# Patient Record
Sex: MALE | Race: WHITE | Employment: UNEMPLOYED | URBAN - METROPOLITAN AREA
[De-identification: names, ages, dates, MRNs, and addresses within clinical notes are randomized per-mention and may not be internally consistent; named-entity substitution may affect disease eponyms.]

---

## 2017-10-18 ENCOUNTER — HOSPITAL ENCOUNTER (OUTPATIENT)
Dept: LAB | Age: 57
Discharge: HOME | End: 2017-10-18
Payer: COMMERCIAL

## 2017-10-18 PROCEDURE — 88305 TISSUE EXAM BY PATHOLOGIST: CPT | Performed by: PLASTIC SURGERY

## 2017-10-18 PROCEDURE — 88331 PATH CONSLTJ SURG 1 BLK 1SPC: CPT | Performed by: PLASTIC SURGERY

## 2018-01-02 PROBLEM — R97.20 ELEVATED PSA, BETWEEN 10 AND LESS THAN 20 NG/ML: Status: ACTIVE | Noted: 2017-12-08

## 2018-01-02 PROBLEM — R52 PAIN MANAGEMENT: Status: ACTIVE | Noted: 2017-12-06

## 2018-01-02 PROBLEM — M54.12 CERVICAL RADICULOPATHY: Status: ACTIVE | Noted: 2017-08-14

## 2018-01-02 PROBLEM — D04.9 SQUAMOUS CELL CARCINOMA IN SITU OF SKIN: Status: ACTIVE | Noted: 2017-12-06

## 2018-01-02 PROBLEM — Z98.890 S/P LEFT ROTATOR CUFF REPAIR: Status: ACTIVE | Noted: 2017-12-06

## 2018-01-02 PROBLEM — M47.812 CERVICAL SPONDYLOSIS WITHOUT MYELOPATHY: Status: ACTIVE | Noted: 2017-06-13

## 2018-01-17 PROBLEM — C61 PROSTATE CANCER (HCC): Status: ACTIVE | Noted: 2018-01-17

## 2018-02-05 PROBLEM — R94.31 NONSPECIFIC ABNORMAL ELECTROCARDIOGRAM (ECG) (EKG): Status: ACTIVE | Noted: 2018-01-05

## 2018-02-05 PROBLEM — I31.39 PERICARDIAL EFFUSION: Status: ACTIVE | Noted: 2018-01-05

## 2018-02-05 PROBLEM — R07.9 CHEST PAIN: Status: ACTIVE | Noted: 2018-01-05

## 2018-03-05 RX ORDER — GUAIFENESIN 100 MG/5ML
81 LIQUID (ML) ORAL DAILY
COMMUNITY
End: 2018-11-27

## 2018-03-08 ENCOUNTER — ANESTHESIA EVENT (OUTPATIENT)
Dept: SURGERY | Age: 58
DRG: 708 | End: 2018-03-08
Payer: COMMERCIAL

## 2018-03-09 ENCOUNTER — HOSPITAL ENCOUNTER (INPATIENT)
Age: 58
LOS: 1 days | Discharge: HOME | DRG: 708 | End: 2018-03-10
Attending: UROLOGY | Admitting: UROLOGY
Payer: COMMERCIAL

## 2018-03-09 ENCOUNTER — ANESTHESIA (OUTPATIENT)
Dept: SURGERY | Age: 58
DRG: 708 | End: 2018-03-09
Payer: COMMERCIAL

## 2018-03-09 DIAGNOSIS — C61 MALIGNANT NEOPLASM OF PROSTATE (HCC): Primary | ICD-10-CM

## 2018-03-09 PROCEDURE — 77030008683 HC TU ET CUF COVD -A: Performed by: ANESTHESIOLOGY

## 2018-03-09 PROCEDURE — 77030022704 HC SUT VLOC COVD -B: Performed by: UROLOGY

## 2018-03-09 PROCEDURE — 76210000017 HC OR PH I REC 1.5 TO 2 HR: Performed by: UROLOGY

## 2018-03-09 PROCEDURE — 77030031139 HC SUT VCRL2 J&J -A: Performed by: UROLOGY

## 2018-03-09 PROCEDURE — 77030014647 HC SEAL FBRN TISSL BAXT -D: Performed by: UROLOGY

## 2018-03-09 PROCEDURE — 74011000250 HC RX REV CODE- 250: Performed by: UROLOGY

## 2018-03-09 PROCEDURE — 76010000880 HC OR TIME 4 TO 4.5HR INTENSV - TIER 2: Performed by: UROLOGY

## 2018-03-09 PROCEDURE — 77030011640 HC PAD GRND REM COVD -A: Performed by: UROLOGY

## 2018-03-09 PROCEDURE — 77030013449 HC CLP LIG TELE -A: Performed by: UROLOGY

## 2018-03-09 PROCEDURE — 74011250636 HC RX REV CODE- 250/636

## 2018-03-09 PROCEDURE — 77030018842 HC SOL IRR SOD CL 9% BAXT -A: Performed by: UROLOGY

## 2018-03-09 PROCEDURE — 77030016151 HC PROTCTR LNS DFOG COVD -B: Performed by: UROLOGY

## 2018-03-09 PROCEDURE — 74011250636 HC RX REV CODE- 250/636: Performed by: ANESTHESIOLOGY

## 2018-03-09 PROCEDURE — 77030010935 HC CLP LIG ABSRB TELE -B: Performed by: UROLOGY

## 2018-03-09 PROCEDURE — 77030034696 HC CATH URETH FOL 2W BARD -A: Performed by: UROLOGY

## 2018-03-09 PROCEDURE — 77030008771 HC TU NG SALEM SUMP -A: Performed by: ANESTHESIOLOGY

## 2018-03-09 PROCEDURE — 74011250637 HC RX REV CODE- 250/637: Performed by: UROLOGY

## 2018-03-09 PROCEDURE — 77030010507 HC ADH SKN DERMBND J&J -B: Performed by: UROLOGY

## 2018-03-09 PROCEDURE — 88309 TISSUE EXAM BY PATHOLOGIST: CPT | Performed by: UROLOGY

## 2018-03-09 PROCEDURE — 77030008462 HC STPLR SKN PROX J&J -A: Performed by: UROLOGY

## 2018-03-09 PROCEDURE — 88305 TISSUE EXAM BY PATHOLOGIST: CPT | Performed by: UROLOGY

## 2018-03-09 PROCEDURE — 77030002966 HC SUT PDS J&J -A: Performed by: UROLOGY

## 2018-03-09 PROCEDURE — 74011000272 HC RX REV CODE- 272: Performed by: UROLOGY

## 2018-03-09 PROCEDURE — 74011250636 HC RX REV CODE- 250/636: Performed by: NURSE ANESTHETIST, CERTIFIED REGISTERED

## 2018-03-09 PROCEDURE — 74011250636 HC RX REV CODE- 250/636: Performed by: UROLOGY

## 2018-03-09 PROCEDURE — 8E0W0CZ ROBOTIC ASSISTED PROCEDURE OF TRUNK REGION, OPEN APPROACH: ICD-10-PCS | Performed by: UROLOGY

## 2018-03-09 PROCEDURE — 77030009848 HC PASSR SUT SET COOP -C: Performed by: UROLOGY

## 2018-03-09 PROCEDURE — 77030010513 HC APPL CLP LIG J&J -C: Performed by: UROLOGY

## 2018-03-09 PROCEDURE — 77030003028 HC SUT VCRL J&J -A: Performed by: UROLOGY

## 2018-03-09 PROCEDURE — 77030035048 HC TRCR ENDOSC OPTCL COVD -B: Performed by: UROLOGY

## 2018-03-09 PROCEDURE — 77030032490 HC SLV COMPR SCD KNE COVD -B: Performed by: UROLOGY

## 2018-03-09 PROCEDURE — 74011000250 HC RX REV CODE- 250

## 2018-03-09 PROCEDURE — 77030018846 HC SOL IRR STRL H20 ICUM -A: Performed by: UROLOGY

## 2018-03-09 PROCEDURE — 77030002986 HC SUT PROL J&J -A: Performed by: UROLOGY

## 2018-03-09 PROCEDURE — C1729 CATH, DRAINAGE: HCPCS | Performed by: UROLOGY

## 2018-03-09 PROCEDURE — 76060000039 HC ANESTHESIA 4 TO 4.5 HR: Performed by: UROLOGY

## 2018-03-09 PROCEDURE — 0VT00ZZ RESECTION OF PROSTATE, OPEN APPROACH: ICD-10-PCS | Performed by: UROLOGY

## 2018-03-09 PROCEDURE — 07BC0ZX EXCISION OF PELVIS LYMPHATIC, OPEN APPROACH, DIAGNOSTIC: ICD-10-PCS | Performed by: UROLOGY

## 2018-03-09 PROCEDURE — 77030010545: Performed by: UROLOGY

## 2018-03-09 PROCEDURE — 77030009852 HC PCH RTVR ENDOSC COVD -B: Performed by: UROLOGY

## 2018-03-09 PROCEDURE — 88307 TISSUE EXAM BY PATHOLOGIST: CPT | Performed by: UROLOGY

## 2018-03-09 PROCEDURE — 77030035277 HC OBTRTR BLDELSS DISP INTU -B: Performed by: UROLOGY

## 2018-03-09 PROCEDURE — 77030002933 HC SUT MCRYL J&J -A: Performed by: UROLOGY

## 2018-03-09 PROCEDURE — 77030013079 HC BLNKT BAIR HGGR 3M -A: Performed by: ANESTHESIOLOGY

## 2018-03-09 PROCEDURE — 77030010939 HC CLP LIG TELE -B: Performed by: UROLOGY

## 2018-03-09 PROCEDURE — 77030035045 HC TRCR ENDOSC VRSPRT BLDLSS COVD -B: Performed by: UROLOGY

## 2018-03-09 PROCEDURE — 77030010517 HC APPL SEAL FLOSEL BAXT -B: Performed by: UROLOGY

## 2018-03-09 PROCEDURE — 77030027138 HC INCENT SPIROMETER -A

## 2018-03-09 PROCEDURE — 65270000029 HC RM PRIVATE

## 2018-03-09 PROCEDURE — 77030018813 HC SCIS LAPSCP EPIX DISP AMR -B: Performed by: UROLOGY

## 2018-03-09 PROCEDURE — 77030020255 HC SOL INJ LR 1000ML BG

## 2018-03-09 PROCEDURE — 74011250637 HC RX REV CODE- 250/637

## 2018-03-09 RX ORDER — NALOXONE HYDROCHLORIDE 0.4 MG/ML
0.4 INJECTION, SOLUTION INTRAMUSCULAR; INTRAVENOUS; SUBCUTANEOUS AS NEEDED
Status: DISCONTINUED | OUTPATIENT
Start: 2018-03-09 | End: 2018-03-10 | Stop reason: HOSPADM

## 2018-03-09 RX ORDER — OXYBUTYNIN CHLORIDE 5 MG/1
5 TABLET ORAL
Status: DISCONTINUED | OUTPATIENT
Start: 2018-03-09 | End: 2018-03-10 | Stop reason: HOSPADM

## 2018-03-09 RX ORDER — VECURONIUM BROMIDE FOR INJECTION 1 MG/ML
INJECTION, POWDER, LYOPHILIZED, FOR SOLUTION INTRAVENOUS AS NEEDED
Status: DISCONTINUED | OUTPATIENT
Start: 2018-03-09 | End: 2018-03-09 | Stop reason: HOSPADM

## 2018-03-09 RX ORDER — MAGNESIUM SULFATE 100 %
4 CRYSTALS MISCELLANEOUS AS NEEDED
Status: DISCONTINUED | OUTPATIENT
Start: 2018-03-09 | End: 2018-03-09

## 2018-03-09 RX ORDER — NEOSTIGMINE METHYLSULFATE 5 MG/5 ML
SYRINGE (ML) INTRAVENOUS AS NEEDED
Status: DISCONTINUED | OUTPATIENT
Start: 2018-03-09 | End: 2018-03-09 | Stop reason: HOSPADM

## 2018-03-09 RX ORDER — CEFAZOLIN SODIUM 2 G/50ML
2 SOLUTION INTRAVENOUS
Status: COMPLETED | OUTPATIENT
Start: 2018-03-09 | End: 2018-03-09

## 2018-03-09 RX ORDER — HYDROMORPHONE HYDROCHLORIDE 2 MG/ML
1 INJECTION, SOLUTION INTRAMUSCULAR; INTRAVENOUS; SUBCUTANEOUS
Status: DISCONTINUED | OUTPATIENT
Start: 2018-03-09 | End: 2018-03-10 | Stop reason: HOSPADM

## 2018-03-09 RX ORDER — GLYCOPYRROLATE 0.2 MG/ML
INJECTION INTRAMUSCULAR; INTRAVENOUS AS NEEDED
Status: DISCONTINUED | OUTPATIENT
Start: 2018-03-09 | End: 2018-03-09 | Stop reason: HOSPADM

## 2018-03-09 RX ORDER — SODIUM CHLORIDE, SODIUM LACTATE, POTASSIUM CHLORIDE, CALCIUM CHLORIDE 600; 310; 30; 20 MG/100ML; MG/100ML; MG/100ML; MG/100ML
50 INJECTION, SOLUTION INTRAVENOUS CONTINUOUS
Status: DISCONTINUED | OUTPATIENT
Start: 2018-03-10 | End: 2018-03-09 | Stop reason: HOSPADM

## 2018-03-09 RX ORDER — LIDOCAINE HYDROCHLORIDE 20 MG/ML
INJECTION, SOLUTION EPIDURAL; INFILTRATION; INTRACAUDAL; PERINEURAL AS NEEDED
Status: DISCONTINUED | OUTPATIENT
Start: 2018-03-09 | End: 2018-03-09 | Stop reason: HOSPADM

## 2018-03-09 RX ORDER — DIPHENHYDRAMINE HYDROCHLORIDE 50 MG/ML
12.5 INJECTION, SOLUTION INTRAMUSCULAR; INTRAVENOUS
Status: DISCONTINUED | OUTPATIENT
Start: 2018-03-09 | End: 2018-03-10 | Stop reason: HOSPADM

## 2018-03-09 RX ORDER — SODIUM CHLORIDE, SODIUM LACTATE, POTASSIUM CHLORIDE, CALCIUM CHLORIDE 600; 310; 30; 20 MG/100ML; MG/100ML; MG/100ML; MG/100ML
25 INJECTION, SOLUTION INTRAVENOUS CONTINUOUS
Status: DISCONTINUED | OUTPATIENT
Start: 2018-03-09 | End: 2018-03-09

## 2018-03-09 RX ORDER — SODIUM CHLORIDE 0.9 % (FLUSH) 0.9 %
5-10 SYRINGE (ML) INJECTION AS NEEDED
Status: DISCONTINUED | OUTPATIENT
Start: 2018-03-09 | End: 2018-03-09 | Stop reason: HOSPADM

## 2018-03-09 RX ORDER — ONDANSETRON 2 MG/ML
4 INJECTION INTRAMUSCULAR; INTRAVENOUS
Status: DISCONTINUED | OUTPATIENT
Start: 2018-03-09 | End: 2018-03-10 | Stop reason: HOSPADM

## 2018-03-09 RX ORDER — FENTANYL CITRATE 50 UG/ML
INJECTION, SOLUTION INTRAMUSCULAR; INTRAVENOUS AS NEEDED
Status: DISCONTINUED | OUTPATIENT
Start: 2018-03-09 | End: 2018-03-09 | Stop reason: HOSPADM

## 2018-03-09 RX ORDER — ONDANSETRON 2 MG/ML
INJECTION INTRAMUSCULAR; INTRAVENOUS
Status: DISPENSED
Start: 2018-03-09 | End: 2018-03-10

## 2018-03-09 RX ORDER — DOCUSATE SODIUM 100 MG/1
100 CAPSULE, LIQUID FILLED ORAL DAILY
Status: DISCONTINUED | OUTPATIENT
Start: 2018-03-10 | End: 2018-03-10 | Stop reason: HOSPADM

## 2018-03-09 RX ORDER — SODIUM CHLORIDE 0.9 % (FLUSH) 0.9 %
5-10 SYRINGE (ML) INJECTION AS NEEDED
Status: DISCONTINUED | OUTPATIENT
Start: 2018-03-09 | End: 2018-03-10 | Stop reason: HOSPADM

## 2018-03-09 RX ORDER — OXYBUTYNIN CHLORIDE 5 MG/1
5 TABLET ORAL
Qty: 20 TAB | Refills: 0 | Status: SHIPPED | OUTPATIENT
Start: 2018-03-09 | End: 2018-04-18

## 2018-03-09 RX ORDER — SODIUM CHLORIDE, SODIUM LACTATE, POTASSIUM CHLORIDE, CALCIUM CHLORIDE 600; 310; 30; 20 MG/100ML; MG/100ML; MG/100ML; MG/100ML
100 INJECTION, SOLUTION INTRAVENOUS CONTINUOUS
Status: DISCONTINUED | OUTPATIENT
Start: 2018-03-09 | End: 2018-03-10 | Stop reason: HOSPADM

## 2018-03-09 RX ORDER — CEFAZOLIN SODIUM 2 G/50ML
2 SOLUTION INTRAVENOUS EVERY 8 HOURS
Status: COMPLETED | OUTPATIENT
Start: 2018-03-09 | End: 2018-03-10

## 2018-03-09 RX ORDER — FENTANYL CITRATE 50 UG/ML
50 INJECTION, SOLUTION INTRAMUSCULAR; INTRAVENOUS
Status: DISCONTINUED | OUTPATIENT
Start: 2018-03-09 | End: 2018-03-09

## 2018-03-09 RX ORDER — MIDAZOLAM HYDROCHLORIDE 1 MG/ML
INJECTION, SOLUTION INTRAMUSCULAR; INTRAVENOUS AS NEEDED
Status: DISCONTINUED | OUTPATIENT
Start: 2018-03-09 | End: 2018-03-09 | Stop reason: HOSPADM

## 2018-03-09 RX ORDER — PROPOFOL 10 MG/ML
INJECTION, EMULSION INTRAVENOUS AS NEEDED
Status: DISCONTINUED | OUTPATIENT
Start: 2018-03-09 | End: 2018-03-09 | Stop reason: HOSPADM

## 2018-03-09 RX ORDER — FENTANYL CITRATE 50 UG/ML
25 INJECTION, SOLUTION INTRAMUSCULAR; INTRAVENOUS AS NEEDED
Status: COMPLETED | OUTPATIENT
Start: 2018-03-09 | End: 2018-03-09

## 2018-03-09 RX ORDER — FAMOTIDINE 20 MG/1
20 TABLET, FILM COATED ORAL ONCE
Status: COMPLETED | OUTPATIENT
Start: 2018-03-10 | End: 2018-03-09

## 2018-03-09 RX ORDER — SODIUM CHLORIDE 0.9 % (FLUSH) 0.9 %
5-10 SYRINGE (ML) INJECTION EVERY 8 HOURS
Status: DISCONTINUED | OUTPATIENT
Start: 2018-03-09 | End: 2018-03-10

## 2018-03-09 RX ORDER — SODIUM CHLORIDE 0.9 % (FLUSH) 0.9 %
5-10 SYRINGE (ML) INJECTION EVERY 8 HOURS
Status: DISCONTINUED | OUTPATIENT
Start: 2018-03-09 | End: 2018-03-09 | Stop reason: HOSPADM

## 2018-03-09 RX ORDER — DEXTROSE 50 % IN WATER (D50W) INTRAVENOUS SYRINGE
25-50 AS NEEDED
Status: DISCONTINUED | OUTPATIENT
Start: 2018-03-09 | End: 2018-03-09

## 2018-03-09 RX ORDER — HYDROMORPHONE HYDROCHLORIDE 2 MG/1
2 TABLET ORAL
Status: DISCONTINUED | OUTPATIENT
Start: 2018-03-09 | End: 2018-03-10 | Stop reason: HOSPADM

## 2018-03-09 RX ORDER — BUPIVACAINE HYDROCHLORIDE 5 MG/ML
INJECTION, SOLUTION EPIDURAL; INTRACAUDAL AS NEEDED
Status: DISCONTINUED | OUTPATIENT
Start: 2018-03-09 | End: 2018-03-09 | Stop reason: HOSPADM

## 2018-03-09 RX ORDER — SUCCINYLCHOLINE CHLORIDE 20 MG/ML
INJECTION INTRAMUSCULAR; INTRAVENOUS AS NEEDED
Status: DISCONTINUED | OUTPATIENT
Start: 2018-03-09 | End: 2018-03-09 | Stop reason: HOSPADM

## 2018-03-09 RX ORDER — FAMOTIDINE 20 MG/1
TABLET, FILM COATED ORAL
Status: COMPLETED
Start: 2018-03-09 | End: 2018-03-09

## 2018-03-09 RX ORDER — DOCUSATE SODIUM 100 MG/1
100 CAPSULE, LIQUID FILLED ORAL 2 TIMES DAILY
Qty: 30 CAP | Refills: 0 | Status: SHIPPED | OUTPATIENT
Start: 2018-03-09 | End: 2018-03-24

## 2018-03-09 RX ORDER — ENOXAPARIN SODIUM 100 MG/ML
40 INJECTION SUBCUTANEOUS DAILY
Qty: 28 SYRINGE | Refills: 0 | Status: SHIPPED | OUTPATIENT
Start: 2018-03-09 | End: 2018-04-18

## 2018-03-09 RX ORDER — HEPARIN SODIUM 5000 [USP'U]/ML
5000 INJECTION, SOLUTION INTRAVENOUS; SUBCUTANEOUS EVERY 8 HOURS
Status: DISCONTINUED | OUTPATIENT
Start: 2018-03-09 | End: 2018-03-10 | Stop reason: HOSPADM

## 2018-03-09 RX ORDER — ONDANSETRON 2 MG/ML
4 INJECTION INTRAMUSCULAR; INTRAVENOUS
Status: COMPLETED | OUTPATIENT
Start: 2018-03-09 | End: 2018-03-09

## 2018-03-09 RX ORDER — TRAMADOL HYDROCHLORIDE 50 MG/1
50 TABLET ORAL
Qty: 30 TAB | Refills: 0 | Status: SHIPPED | OUTPATIENT
Start: 2018-03-09 | End: 2018-04-18

## 2018-03-09 RX ORDER — HEPARIN SODIUM 5000 [USP'U]/ML
5000 INJECTION, SOLUTION INTRAVENOUS; SUBCUTANEOUS
Status: COMPLETED | OUTPATIENT
Start: 2018-03-09 | End: 2018-03-09

## 2018-03-09 RX ADMIN — GLYCOPYRROLATE 0.4 MG: 0.2 INJECTION INTRAMUSCULAR; INTRAVENOUS at 11:35

## 2018-03-09 RX ADMIN — SODIUM CHLORIDE, SODIUM LACTATE, POTASSIUM CHLORIDE, AND CALCIUM CHLORIDE: 600; 310; 30; 20 INJECTION, SOLUTION INTRAVENOUS at 11:42

## 2018-03-09 RX ADMIN — FENTANYL CITRATE 50 MCG: 50 INJECTION, SOLUTION INTRAMUSCULAR; INTRAVENOUS at 11:35

## 2018-03-09 RX ADMIN — VECURONIUM BROMIDE FOR INJECTION 1 MG: 1 INJECTION, POWDER, LYOPHILIZED, FOR SOLUTION INTRAVENOUS at 10:34

## 2018-03-09 RX ADMIN — VECURONIUM BROMIDE FOR INJECTION 2 MG: 1 INJECTION, POWDER, LYOPHILIZED, FOR SOLUTION INTRAVENOUS at 09:50

## 2018-03-09 RX ADMIN — HEPARIN SODIUM 5000 UNITS: 5000 INJECTION, SOLUTION INTRAVENOUS; SUBCUTANEOUS at 22:31

## 2018-03-09 RX ADMIN — OXYBUTYNIN CHLORIDE 5 MG: 5 TABLET ORAL at 13:01

## 2018-03-09 RX ADMIN — FENTANYL CITRATE 25 MCG: 50 INJECTION, SOLUTION INTRAMUSCULAR; INTRAVENOUS at 12:29

## 2018-03-09 RX ADMIN — HYDROMORPHONE HYDROCHLORIDE 1 MG: 2 INJECTION INTRAMUSCULAR; INTRAVENOUS; SUBCUTANEOUS at 13:32

## 2018-03-09 RX ADMIN — LIDOCAINE HYDROCHLORIDE 40 MG: 20 INJECTION, SOLUTION EPIDURAL; INFILTRATION; INTRACAUDAL; PERINEURAL at 07:40

## 2018-03-09 RX ADMIN — OXYBUTYNIN CHLORIDE 5 MG: 5 TABLET ORAL at 20:10

## 2018-03-09 RX ADMIN — SODIUM CHLORIDE, SODIUM LACTATE, POTASSIUM CHLORIDE, AND CALCIUM CHLORIDE 100 ML/HR: 600; 310; 30; 20 INJECTION, SOLUTION INTRAVENOUS at 20:11

## 2018-03-09 RX ADMIN — FENTANYL CITRATE 50 MCG: 50 INJECTION, SOLUTION INTRAMUSCULAR; INTRAVENOUS at 08:19

## 2018-03-09 RX ADMIN — SODIUM CHLORIDE, SODIUM LACTATE, POTASSIUM CHLORIDE, AND CALCIUM CHLORIDE 100 ML/HR: 600; 310; 30; 20 INJECTION, SOLUTION INTRAVENOUS at 13:34

## 2018-03-09 RX ADMIN — ONDANSETRON 4 MG: 2 INJECTION INTRAMUSCULAR; INTRAVENOUS at 12:15

## 2018-03-09 RX ADMIN — HEPARIN SODIUM 5000 UNITS: 5000 INJECTION, SOLUTION INTRAVENOUS; SUBCUTANEOUS at 07:28

## 2018-03-09 RX ADMIN — MIDAZOLAM HYDROCHLORIDE 2 MG: 1 INJECTION, SOLUTION INTRAMUSCULAR; INTRAVENOUS at 07:27

## 2018-03-09 RX ADMIN — FAMOTIDINE 20 MG: 20 TABLET, FILM COATED ORAL at 07:03

## 2018-03-09 RX ADMIN — Medication 10 ML: at 15:56

## 2018-03-09 RX ADMIN — PROPOFOL 200 MG: 10 INJECTION, EMULSION INTRAVENOUS at 07:40

## 2018-03-09 RX ADMIN — CEFAZOLIN SODIUM 2 G: 2 SOLUTION INTRAVENOUS at 08:00

## 2018-03-09 RX ADMIN — HEPARIN SODIUM 5000 UNITS: 5000 INJECTION, SOLUTION INTRAVENOUS; SUBCUTANEOUS at 13:32

## 2018-03-09 RX ADMIN — SODIUM CHLORIDE, SODIUM LACTATE, POTASSIUM CHLORIDE, AND CALCIUM CHLORIDE 100 ML/HR: 600; 310; 30; 20 INJECTION, SOLUTION INTRAVENOUS at 20:09

## 2018-03-09 RX ADMIN — SODIUM CHLORIDE, SODIUM LACTATE, POTASSIUM CHLORIDE, AND CALCIUM CHLORIDE 25 ML/HR: 600; 310; 30; 20 INJECTION, SOLUTION INTRAVENOUS at 11:50

## 2018-03-09 RX ADMIN — SODIUM CHLORIDE, SODIUM LACTATE, POTASSIUM CHLORIDE, AND CALCIUM CHLORIDE 100 ML/HR: 600; 310; 30; 20 INJECTION, SOLUTION INTRAVENOUS at 13:02

## 2018-03-09 RX ADMIN — SODIUM CHLORIDE, SODIUM LACTATE, POTASSIUM CHLORIDE, AND CALCIUM CHLORIDE 500 ML: 600; 310; 30; 20 INJECTION, SOLUTION INTRAVENOUS at 12:05

## 2018-03-09 RX ADMIN — HYDROMORPHONE HYDROCHLORIDE 1 MG: 2 INJECTION INTRAMUSCULAR; INTRAVENOUS; SUBCUTANEOUS at 17:28

## 2018-03-09 RX ADMIN — VECURONIUM BROMIDE FOR INJECTION 4 MG: 1 INJECTION, POWDER, LYOPHILIZED, FOR SOLUTION INTRAVENOUS at 08:52

## 2018-03-09 RX ADMIN — FENTANYL CITRATE 50 MCG: 50 INJECTION, SOLUTION INTRAMUSCULAR; INTRAVENOUS at 11:27

## 2018-03-09 RX ADMIN — CEFAZOLIN SODIUM 2 G: 2 SOLUTION INTRAVENOUS at 15:55

## 2018-03-09 RX ADMIN — SODIUM CHLORIDE, SODIUM LACTATE, POTASSIUM CHLORIDE, AND CALCIUM CHLORIDE: 600; 310; 30; 20 INJECTION, SOLUTION INTRAVENOUS at 07:33

## 2018-03-09 RX ADMIN — SUCCINYLCHOLINE CHLORIDE 100 MG: 20 INJECTION INTRAMUSCULAR; INTRAVENOUS at 07:40

## 2018-03-09 RX ADMIN — FENTANYL CITRATE 50 MCG: 50 INJECTION, SOLUTION INTRAMUSCULAR; INTRAVENOUS at 07:40

## 2018-03-09 RX ADMIN — SODIUM CHLORIDE, SODIUM LACTATE, POTASSIUM CHLORIDE, AND CALCIUM CHLORIDE 50 ML/HR: 600; 310; 30; 20 INJECTION, SOLUTION INTRAVENOUS at 07:03

## 2018-03-09 RX ADMIN — FENTANYL CITRATE 25 MCG: 50 INJECTION, SOLUTION INTRAMUSCULAR; INTRAVENOUS at 13:03

## 2018-03-09 RX ADMIN — Medication 3 MG: at 11:35

## 2018-03-09 RX ADMIN — VECURONIUM BROMIDE FOR INJECTION 6 MG: 1 INJECTION, POWDER, LYOPHILIZED, FOR SOLUTION INTRAVENOUS at 07:45

## 2018-03-09 RX ADMIN — FENTANYL CITRATE 25 MCG: 50 INJECTION, SOLUTION INTRAMUSCULAR; INTRAVENOUS at 12:34

## 2018-03-09 RX ADMIN — HYDROMORPHONE HYDROCHLORIDE 1 MG: 2 INJECTION INTRAMUSCULAR; INTRAVENOUS; SUBCUTANEOUS at 22:30

## 2018-03-09 NOTE — PERIOP NOTES
Received pt from OR on bed. Pt with oral airway in place with oxygen mask at 10L. Pt attached to monitors, VS obtained and reviewed. Pt arousable to repeated verbal stimulation.

## 2018-03-09 NOTE — PROGRESS NOTES
Plan is for the pt to be taught Lovenox adm before dc. Charge nurse, Darcy notified. She reported she will f/u with the pt's bedside nurse.

## 2018-03-09 NOTE — PERIOP NOTES
Pt with reported c/o pain and abdominal discomfort, repeatedly asking to \"Get up and go to the bathroom I need to poopy\". Pt continually reassured of normal post procedure feelings. RN remains by bedside to assure patient's safety. Unable to medicate for pain due to pt's blood pressure.

## 2018-03-09 NOTE — H&P
Klaus Sevilla   1960  65 y. o.       Pre-op H&P           Encounter Diagnoses       ICD-10-CM ICD-9-CM   1. [facility] C61 [id number]         Assessment:  1. 65 y.o. male with newly diagnosed [id number] GS 3+4 (Diagnosed 18), Prostate cancer in 6/12 cores, prostate volume of 21.99 cm3, Pre-biopsy PSA= 13.22 ng/ml on 17.    2. ED.   3. History of DVT in LLE in  following his left knee replacement. Takes Bridget aspirin, not on any anticoagulants. 4. Numerous orthopedic surgeries  5. Chronic pain  6. Small umbilical hernia     Plan:  RALP with [facility] scheduled for 3/9/18  Medical clearance scheduled for today  Due to previous DVT, pt will need Lovenox post-op for 28 days. Will need to be set up with [facility] for this  Pt is unable to take Tylenol, so he cannot have pain meds with tylenol during admission  Script for Revatio given today for post-op penile rehab     Discussion:  We discussed at length the prognosis of his prostate cancer and options for the treatment which include robotic radical prostatectomy, permanent interstitial radioactive seed implant (brachytherapy), external Beam radiation therapy, combination interstitial and external radiation therapy with or without hormonal therapy. We reviewed extensively the risk and benefits associated with with all the above treatment options.      We also discussed the possibility of incontinence and erectile dysfunction after treatment. He understands that a small percentage of patients do go on to require surgical treatment for incontinence which includes Advance slings and artificial urinary sphincters. We also discussed treatment options for ED.     I discussed the role of PDE5Is with the patient regarding his erectile dysfunction as well as alternative treatment options. We discussed the side effects of PDE5I medications such as priapism, myalgias, headache, flushing, dyspepsia, and visual changes.   The patient currently does not take nitrate medications for chest pain and there are no absolute contraindications to him taking the medication.              Chief Complaint:       Chief Complaint   Patient presents with    Pre-op Exam    Prostate Cancer         HPI: Collin Burrows who presents for above procedure  Patient underwent prostate biopsy on 1/2/18 for elevated PSA of 13.22 ng/ml on 12/7/17. Biopsy revealed GS 3+4, prostate cancer in 6/12 cores, specifically, GS 3+4 in 2/12 cores involving 14% and 60% and GS 3+3 in 4/12 cores involving 5-50%, TRUS volume of 21.99 cm3. All positive cores were on the left except one core of GS 3+3 on the right. He has no family history of prostate cancer.       Patient reports that he recently had a cardiac evaluation by Dr. Gina Leiva with EKG and stress test and states that his studies were normal and that his heart is in great shape.      Bone pain? NO  Back pain? NO  Weight Loss? NO  Decreased appetite? NO     Patient denies any bothersome voiding complaints.      He endorses troubles with ED, stating that he is unable to achieve an erection. Skyra alfonso recently. He was given a sample of viagra at last appt which helped his ED. He denies any cardiac history.      AUA SS 8 (3) 1/17/18  CESAR not recorded.     He denies any history of prior abdominal surgeries.      Dx DATE: 1/2/18     PSA Trend (ng/ml):         Lab Results   Component Value Date/Time     Prostate Specific Ag 13.220 12/07/2017            Past Medical History:   Diagnosis Date    Arthritis      Chronic pain       hip    Elevated PSA      Ill-defined condition       blood clot left groin    Labral tear of hip, degenerative 12/11/2016    Migraines      Prostate cancer ([address]      Thromboembolus ([address]       left leg               Past Surgical History:   Procedure Laterality Date    HX BACK SURGERY         twice two cages and screws    HX KNEE ARTHROSCOPY         torn meniscus left    HX KNEE ARTHROSCOPY         torn ligament left    HX KNEE ARTHROSCOPY         right meniscus twice    HX ORTHOPAEDIC         bilateral elbow muscle removed from joint    HX ORTHOPAEDIC         ctr left    HX ORTHOPAEDIC         left singleton neuroma    HX ORTHOPAEDIC         plantar fasciatis left    HX ORTHOPAEDIC         right cyst removed right foot    HX ROTATOR CUFF REPAIR         right with torn labrum and bicep    TOTAL KNEE ARTHROPLASTY         left     TOTAL KNEE ARTHROPLASTY         partial right                 Social History   Substance Use Topics    Smoking status: Former Smoker       Packs/day: 0.50       Years: 25.00       Quit date: 2014    Smokeless tobacco: Never Used    Alcohol use 1.2 oz/week        2 Cans of beer per week          Comment: per year               Allergies   Allergen Reactions    Codeine Nausea and Vomiting    Tylenol [Acetaminophen] Other (comments)       2/t liver function         History reviewed. No pertinent family history.            Current Outpatient Prescriptions   Medication Sig Dispense Refill    traMADol (ULTRAM-ER) 100 mg tablet TAKE 1 TABLET BY MOUTH ONCE DAILY IN THE EVENING   0    traMADol (ULTRAM) 50 mg tablet TAKE 1 TABLET BY MOUTH 2 TIMES A DAY AS NEEDED   0    multivitamin, tx-iron-ca-min (THERA-M W/ IRON) 9 mg iron-400 mcg tab tablet Take 1 Tab by mouth daily.             Review of Systems  Constitutional: Fever: No  Skin: Rash: No  HEENT: Hearing difficulty: No  Eyes: Blurred vision: No  Cardiovascular: Chest pain: No  Respiratory: Shortness of breath: No  Gastrointestinal: Nausea/vomiting: No  Musculoskeletal: Back pain: Yes  Neurological: Weakness: No  Psychological: Memory loss: No  Comments/additional findings:         Physical Exam:       Visit Vitals    /74    Ht 5' 10\" (1.778 m)    Wt 202 lb (91.6 kg)    BMI 28.98 kg/m2      Constitutional: WDWN, Pleasant and appropriate affect, No acute distress.     HEENT: EOMs in tact, neck supple, no cervical LA or tenderness  CV:  No peripheral swelling noted, RRR, no m,r,g  Respiratory: No respiratory distress or difficulties, lungs CTAB  Abd: NABS, soft, NTND, no inguinal hernias, small umbilical hernia easily reducible  Skin: No jaundice. Neuro/Psych:  Alert and oriented x 3. Affect appropriate.    EXT: no clubbing or cyanosis, warm and perfused        Imaging Reviewed:  CT A/P  2/2/2018  IMPRESSION:    1.  No evidence for mass, lymphadenopathy or metastatic disease.     Previous Biopsies: NO     Pathology reviewed today:        result [place] Grade cores   1/2/18 3+4= 7 POS:6 Total:12            Labs reviewed today:         Results for orders placed or performed in visit on 02/05/18   AMB POC URINALYSIS DIP STICK AUTO W/O MICRO   Result Value Ref Range     Color (UA POC) Yellow       Clarity (UA POC) Clear       Glucose (UA POC) Negative Negative     Bilirubin (UA POC) Negative Negative     Ketones (UA POC) Negative Negative     Specific gravity (UA POC) 1.010 1.001 - 1.035     Blood (UA POC) Trace Negative     pH (UA POC) 5.5 4.6 - 8.0     Protein (UA POC) Negative Negative     Urobilinogen (UA POC) 0.2 mg/dL 0.2 - 1     Nitrites (UA POC) Negative Negative     Leukocyte esterase (UA POC) 1+ Negative            Paulina Farrar MD  , Dept of Urology  [facility]  [facility] [place] [place]  Pager #: [phone]

## 2018-03-09 NOTE — PERIOP NOTES
Intra-op transferring report given to PACU nurse JELANI Hartman. Opportunity for question and clarification provided.

## 2018-03-09 NOTE — IP AVS SNAPSHOT
[address] Patient: Alpa Bill MRN: [id number] :1960 About your hospitalization You were admitted on:  2018 You last received care in the:  [address] [place] [address],[address] You were discharged on:  March 10, 2018 Why you were hospitalized Your primary diagnosis was:  Not on File Your diagnoses also included: Malignant Neoplasm Of Prostate (Hcc), Prostate Cancer (Hcc) Follow-up Information Follow up With Details Comments Contact Info Garland Webster MD In 1 week  [address]  [address] 6009 [facility] [address] 
[phone] Your Scheduled Appointments 2018  9:30 AM EDT Nurse Visit with [facility] PEN NURSE Urology of [address] [place] [address] ([facility]) [address] [place] [address] 300 333 [facility]  
[phone] 2018 11:15 AM EDT  
ESTABLISHED PATIENT with Claudette Sol, MD  
Urology of [facility] 301 [facility] 300 2201 [facility] [address]  
[phone] Discharge Orders Procedure Order Date Status Priority Quantity Spec Type Associated Dx CALL YOUR DOCTOR For: Other Call MD if fever >101.5, signs of infection, intractable pain, nausea or vomiting, worsening shortness of breath or chest pain, significant bleeding, teixeira not draining, painful leg swelling, or any questions or concern. .. 18 1135 Normal Routine 1  Malignant neoplasm of prostate ([address] Dom Yang Comments:  Call MD if fever >101.5, signs of infection, intractable pain, nausea or vomiting, worsening shortness of breath or chest pain, significant bleeding, teixeira not draining, painful leg swelling, or any questions or concerns. Only let a urologist do anything with you teixeira catheter while it is in place. Questions: For:  Other ACTIVITY AFTER DISCHARGE Patient should: Other (specify) No heavy lifting >15 lbs for 4 weeks. No driving on narcotics. OK to shower 48 hours after surgery. No baths or submerging incisions for 4 weeks until incisions are completely healed. 03/09/18 1135 Normal Routine 1  Malignant neoplasm of prostate ([facility] Elmira Amaya Comments:  No heavy lifting >15 lbs for 4 weeks. No driving on narcotics. OK to shower 48 hours after surgery. No baths or submerging incisions for 4 weeks until incisions are completely healed. Questions: Patient should: Other (specify) DIET REGULAR No added salt 03/09/18 1135 Normal Routine 1  Malignant neoplasm of prostate ([facility] Elmira Amaya Questions: Additional options:  No added salt PALMER CATHETER, CARE 03/09/18 1135 Normal Routine 1  Malignant neoplasm of prostate ([facility] Elmira Amaya A check jonathan indicates which time of day the medication should be taken. My Medications START taking these medications Instructions Each Dose to Equal  
 Morning Noon Evening Bedtime  
 docusate sodium 100 mg capsule Commonly known as:  [place]  Your last dose was: Your next dose is: Take 1 Cap by mouth two (2) times a day for 15 days. 100 mg  
    
   
   
   
  
 enoxaparin 40 mg/0.4 mL Commonly known as:  LOVENOX Your last dose was: Your next dose is: 0.4 mL by SubCUTAneous route daily. Indications: PULMONARY THROMBOEMBOLISM PREVENTION  
 40 mg  
    
   
   
   
  
 oxybutynin 5 mg tablet Commonly known as:  CAOUNEBO Your last dose was: Your next dose is: Take 1 Tab by mouth three (3) times daily as needed. Indications: Bladder Hyperactivity, URINARY URGENCY  
 5 mg CHANGE how you take these medications Instructions Each Dose to Equal  
 Morning Noon Evening Bedtime * traMADol 50 mg tablet Commonly known as:  Elina Hinkle  
 What changed:  Another medication with the same name was added. Make sure you understand how and when to take each. Your last dose was: Your next dose is: TAKE 1 TABLET BY MOUTH 2 TIMES A DAY AS NEEDED  
     
   
   
   
  
 * traMADol 100 mg tablet Commonly known as:  ULTRAM-ER What changed:  Another medication with the same name was added. Make sure you understand how and when to take each. Your last dose was: Your next dose is: TAKE 1 TABLET BY MOUTH ONCE DAILY IN THE EVENING  
     
   
   
   
  
 * traMADol 50 mg tablet Commonly known as:  ULTRAM  
What changed: You were already taking a medication with the same name, and this prescription was added. Make sure you understand how and when to take each. Your last dose was: Your next dose is: Take 1 Tab by mouth every six (6) hours as needed for Pain. Max Daily Amount: 200 mg. Indications: Pain 50 mg  
    
   
   
   
  
 * Notice: This list has 3 medication(s) that are the same as other medications prescribed for you. Read the directions carefully, and ask your doctor or other care provider to review them with you. CONTINUE taking these medications Instructions Each Dose to Equal  
 Morning Noon Evening Bedtime  
 aspirin 81 mg chewable tablet Your last dose was: Your next dose is: Take 81 mg by mouth daily. 81 mg  
    
   
   
   
  
 multivitamin, tx-iron-ca-min 9 mg iron-400 mcg Tab tablet Commonly known as:  THERA-M w/ IRON Your last dose was: Your next dose is: Take 1 Tab by mouth daily. 1 Tab  
    
   
   
   
  
 sildenafil (antihypertensive) 20 mg tablet Commonly known as:  REVATIO Your last dose was: Your next dose is: Take 1 Tab by mouth daily. 20 mg Where to Get Your Medications Information on where to get these meds will be given to you by the nurse or doctor. ! Ask your nurse or doctor about these medications  
  docusate sodium 100 mg capsule  
 enoxaparin 40 mg/0.4 mL  
 oxybutynin 5 mg tablet  
 traMADol 50 mg tablet Discharge Instructions Laparoscopic Radical Prostatectomy: What to Expect at [facility] Your Recovery A laparoscopic radical prostatectomy is surgery to remove the prostate gland. It is done to treat prostate cancer that has not spread beyond the prostate. The doctor made several small cuts, called incisions, in your lower belly. The doctor put a lighted tube (scope) and other surgical tools through the incisions to do the surgery. Or if you had robotic surgery, the doctor guided the robot arms to do this surgery. You may see some bruising and swelling right after your surgery. In the week after surgery, your penis and scrotum may swell. This usually gets better after 1 to 2 weeks. The incisions may be sore for 1 to 2 weeks. Your doctor will give you medicine for pain. You will have a tube (urinary catheter) to drain urine from your bladder for 1 to 2 weeks after surgery. You may have bladder cramps, or spasms, while the catheter is in your bladder. Your doctor can give you medicine to help prevent bladder spasms. After your catheter is removed, it may take several weeks or more for you to control your urine. And it may take 6 months or more for you to be able to have erections again. But with time, most men regain urine control and much of their previous sexual function. If not, medicines or other treatments may help. You will probably be able to go back to work or your usual activities 3 to 5 weeks after surgery. But it can take longer to fully recover. You will need to see your doctor regularly. This includes having blood tests to measure your PSA level.  PSA is a substance that can suggest whether your cancer has returned. PSA tests are usually done more often for the first several years after your surgery, but less often after that. Having cancer is scary. You may feel many emotions and need some help coping. It may help to talk with your family, friends, or a therapist about your feelings. You also can do things at home to make yourself feel better while you go through treatment. Call the [employer] (1-563.165.1402) or visit its Web site at [address] for more information. This care sheet gives you a general idea about how long it will take for you to recover. But each person recovers at a different pace. Follow the steps below to get better as quickly as possible. How can you care for yourself at home? Activity ? · Rest when you feel tired. Getting enough sleep will help you recover. ? · Try to walk each day. Start by walking a little more than you did the day before. Bit by bit, increase the amount you walk. Walking boosts blood flow and helps prevent pneumonia and constipation. At first, avoid hills, and try to stay on flat ground. You can climb stairs, but try to limit how often you do this. When you do climb them, do it slowly, and pause every few steps. ? · Avoid strenuous activities for 2 weeks, or until your doctor says it is okay. This includes bicycle riding, jogging, weight lifting, or aerobic exercise. ? · For 2 to 3 weeks or until your doctor says it is okay, avoid lifting anything that would make you strain. This may include a child, heavy grocery bags and milk containers, a heavy briefcase or backpack, cat litter or dog food bags, or a vacuum . ? · You may shower if your doctor says it is okay. Empty the catheter bag before you start. When you shower, keep the catheter taped to your leg. Do not take a bath until your doctor or nurse has removed the catheter. ? · Ask your doctor when you can drive again. ? · Avoid riding in a car for more than 1 hour at a time for the first 3 weeks after surgery. If you must ride in a car for a longer distance, stop often to walk and stretch your legs. ? · You will probably need to take 3 to 5 weeks off from work. It depends on the type of work you do and how you feel. Diet ? · You can eat your normal diet. If your stomach is upset, try bland, low-fat foods like plain rice, broiled chicken, toast, and yogurt. ? · Drink plenty of fluids (unless your doctor tells you not to). ? · You may notice that your bowel movements are not regular right after your surgery. This is common. Try to avoid constipation and straining with bowel movements. You may want to take a fiber supplement every day. If you have not had a bowel movement after a couple of days, ask your doctor about taking a mild laxative. Medicines ? · Your doctor will tell you if and when you can restart your medicines. He or she will also give you instructions about taking any new medicines. ? · If you take blood thinners, such as warfarin (Coumadin), clopidogrel (Plavix), or aspirin, be sure to talk to your doctor. He or she will tell you if and when to start taking those medicines again. Make sure that you understand exactly what your doctor wants you to do. ? · Be safe with medicines. Take pain medicines exactly as directed. ¨ If the doctor gave you a prescription medicine for pain, take it as prescribed. ¨ If you are not taking a prescription pain medicine, ask your doctor if you can take an over-the-counter medicine. ? · If you think your pain medicine is making you sick to your stomach: 
¨ Take your medicine after meals (unless your doctor has told you not to). ¨ Ask your doctor for a different pain medicine. ? · Your doctor may prescribe antibiotics when your urinary catheter is removed. Take them as directed.  Do not stop taking them just because you feel better. You need to take the full course of antibiotics. Incision care ? · If you have strips of tape on the incisions, leave the tape on for a week or until it falls off.  
? · If you had stitches, your doctor will tell you when to come back to have them removed. ? · Wash the area daily with warm water and pat it dry. Don't use hydrogen peroxide or alcohol, which can slow healing. You may cover the area with a gauze bandage if it weeps or rubs against clothing. Change the bandage every day. ? · Keep the area clean and dry. ?Ice ? · To help with pain and swelling, put ice or a cold pack on your groin for 10 to 20 minutes at a time. Put a thin cloth between the ice and your skin. Other instructions ? · You will have a urinary catheter for 1 to 2 weeks. Your doctor or nurse will tell you how to care for it. ? · Be sure the catheter is securely taped to your thigh and attached to the large drainage bag when you are at home. Use the smaller leg bag only when you go out. ? · A little urine leakage around the catheter is normal. You can place an incontinence pad in your underwear to absorb urine leaks. ? · Do not have an enema or use a rectal thermometer for 3 months, or until your doctor says it is okay. Follow-up care is a key part of your treatment and safety. Be sure to make and go to all appointments, and call your doctor if you are having problems. It's also a good idea to know your test results and keep a list of the medicines you take. When should you call for help? Call 911 anytime you think you may need emergency care. For example, call if: 
? · You passed out (lost consciousness). ? · You have chest pain, are short of breath, or cough up blood. ?Call your doctor now or seek immediate medical care if: 
? · You have pain that does not get better after you take pain medicine. ? · You have loose stitches, or your incision comes open. ? · You are bleeding from the incision. ? · You have signs of infection, such as: 
¨ Increased pain, swelling, warmth, or redness. ¨ Red streaks leading from the area. ¨ Pus draining from the area. ¨ A fever. ? · You are unable to urinate. ? · You have symptoms of a urinary tract infection. These may include: 
¨ Pain or burning when you urinate. ¨ A frequent need to urinate without being able to pass much urine. ¨ Pain in the flank, which is just below the rib cage and above the waist on either side of the back. ¨ Blood in your urine. ¨ A fever. ? · You are sick to your stomach or cannot drink fluids. ? · You have signs of a blood clot in your leg (called a deep vein thrombosis), such as: 
¨ Pain in your calf, back of the knee, thigh, or groin. ¨ Redness or swelling in your leg. ? Watch closely for any changes in your health, and be sure to contact your doctor if you have any problems. Where can you learn more? Go to http://harmeet-bhavani.info/. Enter D427 in the search box to learn more about \"Laparoscopic Radical Prostatectomy: What to Expect at Home. \" Current as of: May 12, 2017 Content Version: 11.4 © 2105-1651 [employer]. Care instructions adapted under license by [employer] (which disclaims liability or warranty for this information). If you have questions about a medical condition or this instruction, always ask your healthcare professional. [place] [address] any warranty or liability for your use of this information. DISCHARGE SUMMARY from Nurse PATIENT INSTRUCTIONS: 
 
 
F-face looks uneven A-arms unable to move or move unevenly S-speech slurred or non-existent T-time-call 911 as soon as signs and symptoms begin-DO NOT go Back to bed or wait to see if you get better-TIME IS BRAIN.  
 
Warning Signs of HEART ATTACK  
 
 Call 911 if you have these symptoms: 
? Chest discomfort. Most heart attacks involve discomfort in the center of the chest that lasts more than a few minutes, or that goes away and comes back. It can feel like uncomfortable pressure, squeezing, fullness, or pain. ? Discomfort in other areas of the upper body. Symptoms can include pain or discomfort in one or both arms, the back, neck, jaw, or stomach. ? Shortness of breath with or without chest discomfort. ? Other signs may include breaking out in a cold sweat, nausea, or lightheadedness. Don't wait more than five minutes to call [address]! Fast action can save your life. Calling 911 is almost always the fastest way to get lifesaving treatment. Emergency Medical Services staff can begin treatment when they arrive  up to an hour sooner than if someone gets to the hospital by car. The discharge information has been reviewed with the patient and spouse. The patient and spouse verbalized understanding. Discharge medications reviewed with the patient and spouse and appropriate educational materials and side effects teaching were provided. ___________________________________________________________________________________________________________________________________ Patient armband removed and shredded. [employer] Announcement We are excited to announce that we are making your provider's discharge notes available to you in [employer]. You will see these notes when they are completed and signed by the physician that discharged you from your recent hospital stay. If you have any questions or concerns about any information you see in [employer], please call the Health Information Department where you were seen or reach out to your Primary Care Provider for more information about your plan of care. Introducing [address]!    
 Bridger Campbell introduces [employer] patient portal. Now you can access parts of your medical record, email your doctor's office, and request medication refills online. 1. In your internet browser, go to [email or web address] 2. Click on the First Time User? Click Here link in the Sign In box. You will see the New Member Sign Up page. 3. Enter your plista Access Code exactly as it appears below. You will not need to use this code after youve completed the sign-up process. If you do not sign up before the expiration date, you must request a new code. · plista Access Code: [email or web address] Expires: 3/13/2018  9:40 AM 
 
4. Enter the last four digits of your Social Security Number (xxxx) and Date of Birth (mm/dd/yyyy) as indicated and click Submit. You will be taken to the next sign-up page. 5. Create a plista ID. This will be your plista login ID and cannot be changed, so think of one that is secure and easy to remember. 6. Create a plista password. You can change your password at any time. 7. Enter your Password Reset Question and Answer. This can be used at a later time if you forget your password. 8. Enter your e-mail address. You will receive e-mail notification when new information is available in [address] 9. Click Sign Up. You can now view and download portions of your medical record. 10. Click the Download Summary menu link to download a portable copy of your medical information. If you have questions, please visit the Frequently Asked Questions section of the plista website. Remember, plista is NOT to be used for urgent needs. For medical emergencies, dial 911. Now available from your iPhone and Android! Providers Seen During Your Hospitalization Provider Specialty Primary office phone Regis Rivero MD Urology [phone] Your Primary Care Physician (PCP) Primary Care Physician Office Phone Office Fax Starla Lu [address] You are allergic to the following Allergen Reactions Codeine Nausea and Vomiting Tylenol (Acetaminophen) Other (comments) 2/t liver function Recent Documentation Height Weight BMI Smoking Status 1.778 m 88 kg 27.84 kg/m2 Former Smoker Emergency Contacts Name Discharge Info Relation Home Work Mobile Tori Mcclellan DISCHARGE CAREGIVER [3] Spouse [3] [phone] Patient Belongings The following personal items are in your possession at time of discharge: 
  Dental Appliances: None  Visual Aid: None      Home Medications: None   Jewelry: None  Clothing: Pants, Shirt, Footwear, Socks, Undergarments    Other Valuables: None Discharge Instructions Attachments/References ENOXAPARIN (BY INJECTION) (ENGLISH) Patient Handouts Enoxaparin (By injection) Enoxaparin (ee-nox-a-PAR-in) Prevents and treats blood clots. Also treats heart attacks. This medicine is a blood thinner. Brand Name(s): Amerinet Choice Enoxaparin Sodium, Enoxaparin Sodium Novaplus, Lovenox, PremierPro Rx Lovenox There may be other brand names for this medicine. When This Medicine Should Not Be Used: You should not use this medicine if you have had an allergic reaction to enoxaparin, heparin, benzyl alcohol, or products made from pork. You should not use enoxaparin if you have bleeding disorders or any active bleeding. How to Use This Medicine:  
Injectable · Your doctor will prescribe your exact dose and tell you how often it should be given. This medicine is given as a shot under your skin. · A nurse or other health provider will give you this medicine. It may also be given by a home health caregiver. · You may be taught how to give your medicine at home. Make sure you understand all instructions before giving yourself an injection. Do not use more medicine or use it more often than your doctor tells you to. · You will be shown the body areas where this shot can be given. Use a different body area each time you give yourself a shot. Keep track of where you give each shot to make sure you rotate body areas. · Use a new needle and syringe each time you inject your medicine. If a dose is missed:  
· You must use this medicine on a fixed schedule. Call your doctor or pharmacist if you miss a dose. How to Store and Dispose of This Medicine: · If you store this medicine at home, keep it at room temperature, away from heat and direct light. · If you were given a bottle of medicine to use with your syringes, you must use the medicine within 28 days after the first shot. Throw away the unused medicine in the bottle after 28 days. · Throw away used needles in a hard, closed container that the needles cannot poke through. Keep this container away from children and pets. · Ask your pharmacist, doctor, or health caregiver about the best way to dispose of any leftover medicine, containers, and other supplies. Throw away old medicine after the expiration date has passed. · Keep all medicine out of the reach of children. Never share your medicine with anyone. Drugs and Foods to Avoid: Ask your doctor or pharmacist before using any other medicine, including over-the-counter medicines, vitamins, and herbal products. · Make sure your doctor knows if you are also using blood thinners (such as clopidogrel, warfarin, or Coumadin®). Tell your doctor if you are also using dipyridamole (Persantine®), ketorolac (Toradol®), or sulfinpyrazone (Anturane®). · Make sure your doctor knows if you are using pain or arthritis medicine (such as aspirin, Advil®, Aleve®, Motrin®, Orudis®, Dolobid®, West alla, Indocin®, Relafen®, or Voltaren®). Avoid taking aspirin or medicines that contain aspirin, unless your doctor tells you to. Warnings While Using This Medicine: · Make sure your doctor knows if you are pregnant or breastfeeding, or if you have liver disease, kidney disease, blood vessel problems, diabetes, a heart infection, uncontrolled high blood pressure, a stomach ulcer or bleeding, or a bleeding disorder such as hemophilia. Tell your doctor if you have a bleeding disorder caused by heparin. · Make sure your doctor knows if you have recently had a stroke, or surgery on your eyes, brain, or spine. Tell your doctor if you have had a heart valve replaced. · This medicine may cause bleeding or bruising. This risk is higher if you have a catheter in your back for pain medicine or anesthesia (sometimes called an \"epidural\"), or if you have kidney problems. The risk of bleeding increases if your kidney problems get worse. Discuss this with your doctor if you are concerned. · You may bleed and bruise more easily while you are using this medicine. Be extra careful to avoid injuries until the effects of the medicine have worn off. Stay away from rough sports or other situations where you could be bruised, cut, or injured. Brush and floss your teeth gently. Be careful when using sharp objects, including razors and fingernail clippers. Avoid picking your nose. If you need to blow your nose, blow it gently. · Watch for any bleeding from open areas such as around the injection site. Also check for blood in your urine or stool. If you have any bleeding or injuries, tell your doctor right away. · Tell any doctor or dentist who treats you that you are using this medicine. You may need to stop using this medicine several days before you have surgery or medical tests. · Your doctor will do lab tests at regular visits to check on the effects of this medicine. Keep all appointments. Possible Side Effects While Using This Medicine:  
Call your doctor right away if you notice any of these side effects: · Allergic reaction: Itching or hives, swelling in your face or hands, swelling or tingling in your mouth or throat, chest tightness, trouble breathing · Blood in your urine. · Bloody or black, tarry stools. · Chest pain, shortness of breath, or coughing up blood. · Fever. · Large, flat, blue or purplish patches in the skin. · Numbness or weakness in your arm or leg, or on one side of your body. · Pain in your lower leg (calf). · Sudden or severe headache, problems with vision, speech, or walking. · Swelling in your hands, ankles, or feet. · Uneven heartbeat. · Unusual bleeding or bruising. · Vomiting blood or material that looks like coffee grounds. · Warmth or redness in your face, neck, arms, or upper chest. 
If you notice these less serious side effects, talk with your doctor: · Confusion. · Nausea or diarrhea. · Pain, redness, bruising, swelling, or a lump under your skin where the shot was given. If you notice other side effects that you think are caused by this medicine, tell your doctor. Call your doctor for medical advice about side effects. You may report side effects to FDA at 9-926-FDA-0527 © 2017 [address] Information is for End User's use only and may not be sold, redistributed or otherwise used for commercial purposes. The above information is an  only. It is not intended as medical advice for individual conditions or treatments. Talk to your doctor, nurse or pharmacist before following any medical regimen to see if it is safe and effective for you. Please provide this summary of care documentation to your next provider. Signatures-by signing, you are acknowledging that this After Visit Summary has been reviewed with you and you have received a copy. Patient Signature:  ____________________________________________________________ Date:  ____________________________________________________________  
  
Soraida Hayden Provider Signature:  ____________________________________________________________ Date:  ____________________________________________________________

## 2018-03-09 NOTE — PERIOP NOTES
Dr. Latricia Loyola at bedside evaluating pt d/t hypotension. 500mL LR hung per verbal order. Will reassess after completion.

## 2018-03-09 NOTE — PROGRESS NOTES
conducted a pre-surgery visit with Jie Benitez, who is a 65 y.o.,male. The  provided the following Interventions:  Initiated a relationship of care and support. Offered prayer and assurance of continued prayers on patient's behalf. Plan:  Chaplains will continue to follow and will provide pastoral care on an as needed/requested basis.  recommends bedside caregivers page  on duty if patient shows signs of acute spiritual or emotional distress.     Eneida Shukla   Spiritual Care   [phone]

## 2018-03-09 NOTE — INTERVAL H&P NOTE
H&P Update:  Dayana Houston was seen and examined. History and physical has been reviewed. The patient has been examined.  There have been no significant clinical changes since the completion of the originally dated History and Physical.    Signed By: Mare Bah MD     March 9, 2018 7:20 AM

## 2018-03-09 NOTE — PROGRESS NOTES
Problem: Falls - Risk of  Goal: *Absence of Falls  Document Cordell Fall Risk and appropriate interventions in the flowsheet.    Outcome: Progressing Towards Goal  Fall Risk Interventions:            Medication Interventions: Patient to call before getting OOB, Teach patient to arise slowly

## 2018-03-09 NOTE — PERIOP NOTES
Ditropan administered per [facility] for pt reported \"bladder spasms\". Total of 100mcg Fentanyl administered in PACU in 25mcg doses. Pt's blood pressure tolerated well.

## 2018-03-09 NOTE — PERIOP NOTES
TRANSFER - OUT REPORT:    Verbal report given to KS, RN (name) on Kaylie Marshall  being transferred to 2200(unit) for routine progression of care       Report consisted of patients Situation, Background, Assessment and   Recommendations(SBAR). Information from the following report(s) SBAR, OR Summary, Procedure Summary, Intake/Output and MAR was reviewed with the receiving nurse. Lines:   Peripheral IV 03/09/18 Right Hand (Active)   Site Assessment Clean, dry, & intact 3/9/2018 11:47 AM   Phlebitis Assessment 0 3/9/2018 11:47 AM   Infiltration Assessment 0 3/9/2018 11:47 AM   Dressing Status Clean, dry, & intact 3/9/2018 11:47 AM   Dressing Type Transparent 3/9/2018 11:47 AM   Hub Color/Line Status Pink 3/9/2018 11:47 AM   Alcohol Cap Used Yes 3/9/2018  7:00 AM       Peripheral IV Left Wrist (Active)   Site Assessment Clean, dry, & intact 3/9/2018 11:47 AM   Phlebitis Assessment 0 3/9/2018 11:47 AM   Infiltration Assessment 0 3/9/2018 11:47 AM   Dressing Status Clean, dry, & intact 3/9/2018 11:47 AM   Dressing Type Transparent 3/9/2018 11:47 AM   Hub Color/Line Status Green 3/9/2018 11:47 AM        Opportunity for questions and clarification was provided.       Patient transported with:   Registered Nurse  Tech

## 2018-03-09 NOTE — ANESTHESIA POSTPROCEDURE EVALUATION
Post-Anesthesia Evaluation and Assessment    Patient: Nargis Adams MRN: [id number]  SSN: [id number]    YOB: 1960  Age: 65 y.o. Sex: male     VS from flow sheet    Cardiovascular Function/Vital Signs  Visit Vitals    BP (!) 124/96    Pulse 80    Temp 36.4 °C (97.5 °F)    Resp 18    Ht 5' 10\" (1.778 m)    Wt 88 kg (194 lb)    SpO2 96%    BMI 27.84 kg/m2       Patient is status post general anesthesia for Procedure(s):  Robotic assisted robotic radical prostatectomy bilateral nerve sparing Robotic assisted bilateral pelvic lymph node dissection. Nausea/Vomiting: None    Postoperative hydration reviewed and adequate. Pain:  Pain Scale 1: Numeric (0 - 10) (03/09/18 1228)  Pain Intensity 1: 10 (03/09/18 1228)   Managed    Neurological Status:   Neuro (WDL): Within Defined Limits (03/09/18 1147)   At baseline    Mental Status and Level of Consciousness: Arousable    Pulmonary Status:   O2 Device: Room air (03/09/18 1204)   Adequate oxygenation and airway patent    Complications related to anesthesia: None    Post-anesthesia assessment completed.  No concerns    Signed By: Masood Carter MD     March 9, 2018

## 2018-03-09 NOTE — PROCEDURES
OPERATIVE NOTE    Louis Hummel  1960  MRN: [id number]    LOCATION OF PATIENT:  [facility]    DATE OF OPERATION:  3/9/2018    PREOPERATIVE DIAGNOSIS:  Prostate cancer. POSTOPERATIVE DIAGNOSIS:  Prostate cancer. OPERATION PERFORMED:  Robotic assisted robotic radical prostatectomy bilateral nerve sparing  Robotic assisted bilateral pelvic lymph node dissection    SURGEON:  Avelino Zarate MD.    ASSISTANT SURGEON:  Lidia Rodriguez PGY5    ANESTHESIA:  General endotracheal anesthesia. ESTIMATED BLOOD LOSS:  50 cc    IVFs:   1800 cc crystalloid    DRAINS:  18 Fr teixeira    COMPLICATIONS:  None    CONDITION:  stable    INDICATIONS FOR OPERATION:  The patient is a 65 y.o. man who was found to have an elevated serum PSA at 13.22. The patient underwent a prostate ultrasound with echo-guided needle biopsy and was found to have 6 biopsy core out of 12 was found to have Alec Grade 3+4 adenocarcinoma. The patient was thoroughly and fully counseled about risks, options, benefits and alternatives for the definitive treatment of clinically-localized prostate cancer, and he selected to be treated with Da Sri robotic laparoscopic prostatectomy. FINDINGS:  Accessory Pudendal artery:  none. No evidence of extraprostatic extention. Specimen to Pathology:   Prostate and seminal vesicles  Periprostatic lymph node  Right and left pelvic lymph nodes  Left lateral margin    Operative Findings: The laparoscopic exam showed  no abnormalities, no visceral injuries, and no visible evidence of extraprostatic disease. DESCRIPTION OF OPERATION:  After informed consent was obtained, the patient was placed in  the operating room in the supine position.  After induction with  general endotracheal anesthesia, the arms were tucked to the  sides and carefully padded with eggcrate, as well as rolls on the  hands to keep the hands in neutral position bilateral.  Sequential compression devices were placed on the lower  extremities, and the legs were placed in low lithotomy in the  yellow thin stirrups. The patient was placed in steep  Trendelenburg position. An orogastric tube was placed. Albina Mort was placed over the upper torso, and a Torres stand placed  over the upper torso to prevent instruments from falling near the  airway. All pressure points were carefully inspected and padded  in the final surgical position. The abdomen and genitalia were  then prepped and draped in the usual sterile manner. A Veress needle was placed in the supraumbilical  location, and saline drop test used to confirm patency, and  pneumoperitoneum was established to a maximum pressure of 15 mmHg  CO2 pressure. Supraumbilical incision was made, after  infiltration with 0.5% Marcaine plain, and a 12 mm port was  placed. The laparoscope was inserted, and systematic exam of the  intraperitoneal contents showed no visceral injuries or other  abnormalities; however, the patient was noted to have a fairly  large patulous sigmoid colon. Next, under direct laparoscopic  vision, the following ports were placed: Robotic working ports  were placed 10 cm inferolateral from the supraumbilical location,  and a robotic port was placed 6 cm posterior from the left  robotic working port, and a 12 mm assistant port was placed 6 cm  posterior from the right robotic working port, and a 5 mm port  was placed midway between the supraumbilical port and the right  robotic working port. Once those ports were appropriately  positioned and established, the Salorixrt robot was brought into  the surgical field and docked to the appropriate ports. Eric  forceps were placed in the fourth arm, and monopolar chintan in  the right, and Gyrus forceps in the left. The peritoneum lateral  to the right medial umbilical ligament was incised sharply,  exposing the space of Retzius.  The right medial, then median,  and then finally left medial umbilical ligaments were thoroughly  cauterized and transected allowing the bladder to fall away from  the anterior abdominal wall. Next, the Eric forceps were used  to provide cephalad traction of the bladder. The incision into  the peritoneum was carried down to the level of the vas deference  bilateral. Next, the fibrofatty tissue overlying the prostate  gland was carefully resected and sent to Pathology for analysis. The endopelvic fascia was incised on either aspect of the  prostate gland, and the lateral dissection and apical dissection  of the prostate proceeded. The puboprostatic ligaments were  divided close to the prostate gland. The dorsal vein complex was  ligated with a 0 Vicryl suture in a running fashion after dividing the vein sharply with scissors. The pneumoperitoneum was temporarily increased to 20 mm Hg for this portion of the procedure. Next, the bladder neck was katty-transected  flush against the base of the prostate gland in a bladder  neck preservation technique. Next, the  posterior bladder neck was transected with cautery to sharply  separate the posterior bladder neck from the prostate. The  ureteral orifices were easily identified, and were well away from  the surgical dissection plane. The dissection plane proceeded  down through the anterior leaflet of Denonvilliers fascia  exposing the underlying ampulla of the vas deference and seminal  vesicles. The ampulla of the vas deference was resected  approximately 1.5 cm in length and transected. The seminal  vesicles were resected down to their apices, and then the apical vessels were   ligated with gyrus bipolar device. Next, the posterior leaflet of Denonvilliers fascia  was sharply incised  the rectum from the undersurface  of the prostate gland.  The posterior prostate pedicles were then  divided with Hem-O-Devika clips, and the neurovascular bundles were  then sharply  from the undersurface of the posterior  lateral prostate with sharp cold scissor dissection from the base  to the apex of the prostate. Upon completion of the bilateral  nerve-sparing approach, the prostate was essentially free with  the exception of the urethra. The urethra was then  katty-transected flush against the apex of the prostate using  sharp cold scissor dissection. The urethrotomy was performed,  and the catheter withdrawn inside the urethra and the posterior  urethra was transected flush against the apex of the prostate  preserving as much urethral length as possible. Specimen was  then placed in an EndoCatch bag, positioned in the upper abdomen  for future retrieval. The wound was copiously and thoroughly  irrigated. Several bleeders were ligated with a 4-0 vicryl suture in a figure of eight fashion along the neurovascular bundles for hemostasis. A rectal test was then performed by filling the pelvis with irrigation and no rectal injury was seen. Next, the fibrofatty tissue overlying the right external iliac vein from circumflex iliac vein distally to the bifurcation of the common iliac vein proximally, the pelvic sidewall laterally, and the obturator nerve posteriorly was resected. Biopolar cauterization and/or small 5 mm clips were used to ligate lymphatic vessels. No injuries occurred to the adjacent structures and hemostasis was complete. A few weck clips were used. The was a small bleeder near the external iliac vein which had to be oversewn with a 4-0 Prolene suture. The same dissection was carried out on the contralateral side. The lymph node specimens were sent to pathology for analysis. A V-lock suture was used to perform a single layer Ankit stitch, re-approximating Denonvillier's fascia to take tension off of the vesicourethral anastomosis.      Next, the vesicourethral anastomosis was performed using a 3-0 Monocryl V-LOCK double-arm suture technique in a continuous   running tension-free and watertight anastomosis around a 18-Luxembourger silastic Cardoza catheter. At the end of the  anastomosis, the anastomotic suture was tied snugly and the  finishing catheter positioned well in the bladder. Fifteen cc was placed in the Cardoza catheter balloon usingsterile water. The bladder was filled to approximately 200 cc of  sterile saline, no leaking was noted. Some FloSeal was placed  along the neurovascular bundles, and hemostasis was nicely complete. The Da Sri  surgical system was undocked and removed from the patient's  bedside, and under direct laparoscopic vision each of the port  sites was removed. The supraumbilical port was extended to  approximately 2 cm in length sharply with a #15 blade. The  fascia was also sharply extended, and the specimen extracted and  sent to Pathology for analysis. The fascia of the supraumbilical  location was closed in a running manner with 0 PDS suture,  and each of the port sites was irrigated. The skin of each port  closed with a 4-0 Monocryl subcuticular suture. Dermabond  was applied, and the patient was extubated in the operating room  and sent to recovery room in stable condition.     Anshu Rodriguez MD  , Dept of Urology  [facility]  [facility] [place], [place]  Pager #: [phone]

## 2018-03-09 NOTE — PROGRESS NOTES
Patient arrived to unit via bed from [facility]. Patient alert in bed, admitted by Yahaira Cortez RN admission nurse.

## 2018-03-09 NOTE — ANESTHESIA PREPROCEDURE EVALUATION
Anesthetic History   No history of anesthetic complications            Review of Systems / Medical History  Patient summary reviewed and pertinent labs reviewed    Pulmonary  Within defined limits                 Neuro/Psych   Within defined limits           Cardiovascular    Hypertension                   GI/Hepatic/Renal     GERD           Endo/Other        Arthritis     Other Findings   Comments: Documentation of current medication  Current medications obtained, documented and obtained? YES      Risk Factors for Postoperative nausea/vomiting:       History of postoperative nausea/vomiting? NO       Female? NO       Motion sickness? NO       Intended opioid administration for postoperative analgesia? YES      Smoking Abstinence:  Current Smoker? NO  Elective Surgery? YES  Seen preoperatively by anesthesiologist or proxy prior to day of surgery? YES  Pt abstained from smoking 24 hours prior to anesthesia?  N/A    Preventive care/screening for High Blood Pressure:  Aged 18 years and older: YES  Screened for high blood pressure: YES  Patients with high blood pressure referred to primary care provider   for BP management: YES                 Physical Exam    Airway  Mallampati: III  TM Distance: 4 - 6 cm  Neck ROM: decreased range of motion   Mouth opening: Diminished (comment)     Cardiovascular    Rhythm: irregular  Rate: normal         Dental    Dentition: Poor dentition  Comments: Missing teeth   Pulmonary  Breath sounds clear to auscultation               Abdominal  GI exam deferred       Other Findings            Anesthetic Plan    ASA: 2  Anesthesia type: general          Induction: Intravenous  Anesthetic plan and risks discussed with: Patient      Need glide scope

## 2018-03-10 VITALS
SYSTOLIC BLOOD PRESSURE: 114 MMHG | HEIGHT: 70 IN | DIASTOLIC BLOOD PRESSURE: 74 MMHG | BODY MASS INDEX: 27.77 KG/M2 | HEART RATE: 87 BPM | WEIGHT: 194 LBS | OXYGEN SATURATION: 90 % | TEMPERATURE: 98.9 F | RESPIRATION RATE: 18 BRPM

## 2018-03-10 LAB
ANION GAP SERPL CALC-SCNC: 11 MMOL/L (ref 3–18)
BUN SERPL-MCNC: 12 MG/DL (ref 7–18)
BUN/CREAT SERPL: 17 (ref 12–20)
CALCIUM SERPL-MCNC: 8 MG/DL (ref 8.5–10.1)
CHLORIDE SERPL-SCNC: 104 MMOL/L (ref 100–108)
CO2 SERPL-SCNC: 27 MMOL/L (ref 21–32)
CREAT SERPL-MCNC: 0.72 MG/DL (ref 0.6–1.3)
ERYTHROCYTE [DISTWIDTH] IN BLOOD BY AUTOMATED COUNT: 13.2 % (ref 11.6–14.5)
GLUCOSE SERPL-MCNC: 107 MG/DL (ref 74–99)
HCT VFR BLD AUTO: 37.7 % (ref 36–48)
HGB BLD-MCNC: 12.6 G/DL (ref 13–16)
MCH RBC QN AUTO: 29.6 PG (ref 24–34)
MCHC RBC AUTO-ENTMCNC: 33.4 G/DL (ref 31–37)
MCV RBC AUTO: 88.5 FL (ref 74–97)
PLATELET # BLD AUTO: 219 K/UL (ref 135–420)
PMV BLD AUTO: 9.9 FL (ref 9.2–11.8)
POTASSIUM SERPL-SCNC: 3.9 MMOL/L (ref 3.5–5.5)
RBC # BLD AUTO: 4.26 M/UL (ref 4.7–5.5)
SODIUM SERPL-SCNC: 142 MMOL/L (ref 136–145)
WBC # BLD AUTO: 10.7 K/UL (ref 4.6–13.2)

## 2018-03-10 PROCEDURE — 77030020255 HC SOL INJ LR 1000ML BG

## 2018-03-10 PROCEDURE — 85027 COMPLETE CBC AUTOMATED: CPT | Performed by: UROLOGY

## 2018-03-10 PROCEDURE — 77030010545

## 2018-03-10 PROCEDURE — 80048 BASIC METABOLIC PNL TOTAL CA: CPT | Performed by: UROLOGY

## 2018-03-10 PROCEDURE — 74011250637 HC RX REV CODE- 250/637: Performed by: UROLOGY

## 2018-03-10 PROCEDURE — 36415 COLL VENOUS BLD VENIPUNCTURE: CPT | Performed by: UROLOGY

## 2018-03-10 PROCEDURE — 74011250636 HC RX REV CODE- 250/636: Performed by: UROLOGY

## 2018-03-10 RX ADMIN — CEFAZOLIN SODIUM 2 G: 2 SOLUTION INTRAVENOUS at 00:27

## 2018-03-10 RX ADMIN — HYDROMORPHONE HYDROCHLORIDE 2 MG: 2 TABLET ORAL at 02:26

## 2018-03-10 RX ADMIN — HYDROMORPHONE HYDROCHLORIDE 2 MG: 2 TABLET ORAL at 08:13

## 2018-03-10 RX ADMIN — Medication 10 ML: at 00:27

## 2018-03-10 RX ADMIN — OXYBUTYNIN CHLORIDE 5 MG: 5 TABLET ORAL at 09:17

## 2018-03-10 RX ADMIN — DOCUSATE SODIUM 100 MG: 100 CAPSULE, LIQUID FILLED ORAL at 13:31

## 2018-03-10 RX ADMIN — HYDROMORPHONE HYDROCHLORIDE 1 MG: 2 INJECTION INTRAMUSCULAR; INTRAVENOUS; SUBCUTANEOUS at 04:52

## 2018-03-10 RX ADMIN — SODIUM CHLORIDE, SODIUM LACTATE, POTASSIUM CHLORIDE, AND CALCIUM CHLORIDE 100 ML/HR: 600; 310; 30; 20 INJECTION, SOLUTION INTRAVENOUS at 05:04

## 2018-03-10 RX ADMIN — ONDANSETRON 4 MG: 2 INJECTION INTRAMUSCULAR; INTRAVENOUS at 12:19

## 2018-03-10 RX ADMIN — HYDROMORPHONE HYDROCHLORIDE 2 MG: 2 TABLET ORAL at 13:31

## 2018-03-10 RX ADMIN — HEPARIN SODIUM 5000 UNITS: 5000 INJECTION, SOLUTION INTRAVENOUS; SUBCUTANEOUS at 04:53

## 2018-03-10 NOTE — PROGRESS NOTES
Problem: Falls - Risk of  Goal: *Absence of Falls  Document Cordell Fall Risk and appropriate interventions in the flowsheet.    Outcome: Progressing Towards Goal  Fall Risk Interventions:  Mobility Interventions: Patient to call before getting OOB         Medication Interventions: Patient to call before getting OOB    Elimination Interventions: Call light in reach, Patient to call for help with toileting needs

## 2018-03-10 NOTE — ROUTINE PROCESS
Bedside and Verbal shift change report given to Andrea Jordan RN (oncoming nurse) by Leta Gonzalez RN (offgoing nurse). Report included the following information SBAR, Kardex and MAR.

## 2018-03-10 NOTE — MANAGEMENT PLAN
[address]. Pt insurance will not pay for Home Health nurse to come to home to teach Lovenox injection teaching stating it must be done in hospital. Pharmacy where he picks up injection can also do teaching.       Cornelio Kendrick RN BSN  Outcomes Manager  Pager # [phone]

## 2018-03-10 NOTE — ROUTINE PROCESS
Bedside and Verbal shift change report given to Abran Arreguin RN (oncoming nurse) by Sharlene Mcginnis RN (offgoing nurse). Report included the following information SBAR, Kardex, Intake/Output and MAR.

## 2018-03-10 NOTE — DISCHARGE SUMMARY
Urology Hospital Discharge Summary Note    HD#:   2  POD#:  1  Antibiotics:  periop    Admitting Diagnosis:   Prostate cancer    Discharge Diagnosis:   Same as Above    ASSESSMENT:     S/p RARP doing well    PLAN:     -Home today with lovenox as outlined  -fu with Dr. Joe Davies next week for voiding trial    Hospital Course:     CC: Prostate cancer    HPI: No issues overnight after surgery. Some bladder spasms noted. Plan for lovenox. Tolerating diet. Pain controlled with po pain medications. ECOG PS: 0       OBJECTIVE:     Visit Vitals    /74 (BP 1 Location: Left arm, BP Patient Position: At rest)    Pulse 87    Temp 98.9 °F (37.2 °C)    Resp 18    Ht 5' 10\" (1.778 m)    Wt 194 lb (88 kg)    SpO2 90%    BMI 27.84 kg/m2       Gen: Patient is in NAD   Pulm: Equal respiratory effort bilaterally   CV: regular rate   Abd: soft, nontender, non-distended   Ext: No clubbing, cyanosis or edema   Neuro: Grossly intact   Skin: warm and dry     Recent Results (from the past 24 hour(s))   METABOLIC PANEL, BASIC    Collection Time: 03/10/18  4:25 AM   Result Value Ref Range    Sodium 142 136 - 145 mmol/L    Potassium 3.9 3.5 - 5.5 mmol/L    Chloride 104 100 - 108 mmol/L    CO2 27 21 - 32 mmol/L    Anion gap 11 3.0 - 18 mmol/L    Glucose 107 (H) 74 - 99 mg/dL    BUN 12 7.0 - 18 MG/DL    Creatinine 0.72 0.6 - 1.3 MG/DL    BUN/Creatinine ratio 17 12 - 20      GFR est AA >60 >60 ml/min/1.73m2    GFR est non-AA >60 >60 ml/min/1.73m2    Calcium 8.0 (L) 8.5 - 10.1 MG/DL   CBC W/O DIFF    Collection Time: 03/10/18  4:25 AM   Result Value Ref Range    WBC 10.7 4.6 - 13.2 K/uL    RBC 4.26 (L) 4.70 - 5.50 M/uL    HGB 12.6 (L) 13.0 - 16.0 g/dL    HCT 37.7 36.0 - 48.0 %    MCV 88.5 74.0 - 97.0 FL    MCH 29.6 24.0 - 34.0 PG    MCHC 33.4 31.0 - 37.0 g/dL    RDW 13.2 11.6 - 14.5 %    PLATELET 694 894 - 852 K/uL    MPV 9.9 9.2 - 11.8 FL         Michael B. Willie Hodgkin, MD, [address]   Urologic Oncology  [facility] Professor of Urology  Department of Urology  Margie Perez.  [place], [address] [facility]     Pager:  [phone]  Office:  [phone]

## 2018-03-10 NOTE — DISCHARGE INSTRUCTIONS
Laparoscopic Radical Prostatectomy: What to Expect at Home  Your Recovery  A laparoscopic radical prostatectomy is surgery to remove the prostate gland. It is done to treat prostate cancer that has not spread beyond the prostate. The doctor made several small cuts, called incisions, in your lower belly. The doctor put a lighted tube (scope) and other surgical tools through the incisions to do the surgery. Or if you had robotic surgery, the doctor guided the robot arms to do this surgery. You may see some bruising and swelling right after your surgery. In the week after surgery, your penis and scrotum may swell. This usually gets better after 1 to 2 weeks. The incisions may be sore for 1 to 2 weeks. Your doctor will give you medicine for pain. You will have a tube (urinary catheter) to drain urine from your bladder for 1 to 2 weeks after surgery. You may have bladder cramps, or spasms, while the catheter is in your bladder. Your doctor can give you medicine to help prevent bladder spasms. After your catheter is removed, it may take several weeks or more for you to control your urine. And it may take 6 months or more for you to be able to have erections again. But with time, most men regain urine control and much of their previous sexual function. If not, medicines or other treatments may help. You will probably be able to go back to work or your usual activities 3 to 5 weeks after surgery. But it can take longer to fully recover. You will need to see your doctor regularly. This includes having blood tests to measure your PSA level. PSA is a substance that can suggest whether your cancer has returned. PSA tests are usually done more often for the first several years after your surgery, but less often after that. Having cancer is scary. You may feel many emotions and need some help coping. It may help to talk with your family, friends, or a therapist about your feelings.  You also can do things at home to make yourself feel better while you go through treatment. Call the [facility] (4-154.788.4301) or visit its Web site at [address] for more information. This care sheet gives you a general idea about how long it will take for you to recover. But each person recovers at a different pace. Follow the steps below to get better as quickly as possible. How can you care for yourself at home? Activity  ? · Rest when you feel tired. Getting enough sleep will help you recover. ? · Try to walk each day. Start by walking a little more than you did the day before. Bit by bit, increase the amount you walk. Walking boosts blood flow and helps prevent pneumonia and constipation. At first, avoid hills, and try to stay on flat ground. You can climb stairs, but try to limit how often you do this. When you do climb them, do it slowly, and pause every few steps. ? · Avoid strenuous activities for 2 weeks, or until your doctor says it is okay. This includes bicycle riding, jogging, weight lifting, or aerobic exercise. ? · For 2 to 3 weeks or until your doctor says it is okay, avoid lifting anything that would make you strain. This may include a child, heavy grocery bags and milk containers, a heavy briefcase or backpack, cat litter or dog food bags, or a vacuum . ? · You may shower if your doctor says it is okay. Empty the catheter bag before you start. When you shower, keep the catheter taped to your leg. Do not take a bath until your doctor or nurse has removed the catheter. ? · Ask your doctor when you can drive again. ? · Avoid riding in a car for more than 1 hour at a time for the first 3 weeks after surgery. If you must ride in a car for a longer distance, stop often to walk and stretch your legs. ? · You will probably need to take 3 to 5 weeks off from work. It depends on the type of work you do and how you feel. Diet  ? · You can eat your normal diet.  If your stomach is upset, try bland, low-fat foods like plain rice, broiled chicken, toast, and yogurt. ? · Drink plenty of fluids (unless your doctor tells you not to). ? · You may notice that your bowel movements are not regular right after your surgery. This is common. Try to avoid constipation and straining with bowel movements. You may want to take a fiber supplement every day. If you have not had a bowel movement after a couple of days, ask your doctor about taking a mild laxative. Medicines  ? · Your doctor will tell you if and when you can restart your medicines. He or she will also give you instructions about taking any new medicines. ? · If you take blood thinners, such as warfarin (Coumadin), clopidogrel (Plavix), or aspirin, be sure to talk to your doctor. He or she will tell you if and when to start taking those medicines again. Make sure that you understand exactly what your doctor wants you to do. ? · Be safe with medicines. Take pain medicines exactly as directed. ¨ If the doctor gave you a prescription medicine for pain, take it as prescribed. ¨ If you are not taking a prescription pain medicine, ask your doctor if you can take an over-the-counter medicine. ? · If you think your pain medicine is making you sick to your stomach:  ¨ Take your medicine after meals (unless your doctor has told you not to). ¨ Ask your doctor for a different pain medicine. ? · Your doctor may prescribe antibiotics when your urinary catheter is removed. Take them as directed. Do not stop taking them just because you feel better. You need to take the full course of antibiotics. Incision care  ? · If you have strips of tape on the incisions, leave the tape on for a week or until it falls off.   ? · If you had stitches, your doctor will tell you when to come back to have them removed. ? · Wash the area daily with warm water and pat it dry. Don't use hydrogen peroxide or alcohol, which can slow healing.  You may cover the area with a gauze bandage if it weeps or rubs against clothing. Change the bandage every day. ? · Keep the area clean and dry. ?Ice  ? · To help with pain and swelling, put ice or a cold pack on your groin for 10 to 20 minutes at a time. Put a thin cloth between the ice and your skin. Other instructions  ? · You will have a urinary catheter for 1 to 2 weeks. Your doctor or nurse will tell you how to care for it. ? · Be sure the catheter is securely taped to your thigh and attached to the large drainage bag when you are at home. Use the smaller leg bag only when you go out. ? · A little urine leakage around the catheter is normal. You can place an incontinence pad in your underwear to absorb urine leaks. ? · Do not have an enema or use a rectal thermometer for 3 months, or until your doctor says it is okay. Follow-up care is a key part of your treatment and safety. Be sure to make and go to all appointments, and call your doctor if you are having problems. It's also a good idea to know your test results and keep a list of the medicines you take. When should you call for help? Call 911 anytime you think you may need emergency care. For example, call if:  ? · You passed out (lost consciousness). ? · You have chest pain, are short of breath, or cough up blood. ?Call your doctor now or seek immediate medical care if:  ? · You have pain that does not get better after you take pain medicine. ? · You have loose stitches, or your incision comes open. ? · You are bleeding from the incision. ? · You have signs of infection, such as:  ¨ Increased pain, swelling, warmth, or redness. ¨ Red streaks leading from the area. ¨ Pus draining from the area. ¨ A fever. ? · You are unable to urinate. ? · You have symptoms of a urinary tract infection. These may include:  ¨ Pain or burning when you urinate. ¨ A frequent need to urinate without being able to pass much urine.   ¨ Pain in the flank, which is just below the rib cage and above the waist on either side of the back. ¨ Blood in your urine. ¨ A fever. ? · You are sick to your stomach or cannot drink fluids. ? · You have signs of a blood clot in your leg (called a deep vein thrombosis), such as:  ¨ Pain in your calf, back of the knee, thigh, or groin. ¨ Redness or swelling in your leg. ? Watch closely for any changes in your health, and be sure to contact your doctor if you have any problems. Where can you learn more? Go to http://harmeet-bhavani.info/. Enter D427 in the search box to learn more about \"Laparoscopic Radical Prostatectomy: What to Expect at Home. \"  Current as of: May 12, 2017  Content Version: 11.4  © 8954-7490 [employer]. Care instructions adapted under license by "[employer] (which disclaims liability or warranty for this information). If you have questions about a medical condition or this instruction, always ask your healthcare professional. [place] [address] any warranty or liability for your use of this information. DISCHARGE SUMMARY from Nurse    PATIENT INSTRUCTIONS:    After general anesthesia or intravenous sedation, for 24 hours or while taking prescription Narcotics:  · Limit your activities  · Do not drive and operate hazardous machinery  · Do not make important personal or business decisions  · Do  not drink alcoholic beverages  · If you have not urinated within 8 hours after discharge, please contact your surgeon on call.     Report the following to your surgeon:  · Excessive pain, swelling, redness or odor of or around the surgical area  · Temperature over 100.5  · Nausea and vomiting lasting longer than 4 hours or if unable to take medications  · Any signs of decreased circulation or nerve impairment to extremity: change in color, persistent  numbness, tingling, coldness or increase pain  · Any questions    What to do at Home:  Recommended activity: Activity as tolerated. Please follow up with . *  Please give a list of your current medications to your Primary Care Provider. *  Please update this list whenever your medications are discontinued, doses are      changed, or new medications (including over-the-counter products) are added. *  Please carry medication information at all times in case of emergency situations. These are general instructions for a healthy lifestyle:    No smoking/ No tobacco products/ Avoid exposure to second hand smoke  Surgeon General's Warning:  Quitting smoking now greatly reduces serious risk to your health. Obesity, smoking, and sedentary lifestyle greatly increases your risk for illness    A healthy diet, regular physical exercise & weight monitoring are important for maintaining a healthy lifestyle    You may be retaining fluid if you have a history of heart failure or if you experience any of the following symptoms:  Weight gain of 3 pounds or more overnight or 5 pounds in a week, increased swelling in our hands or feet or shortness of breath while lying flat in bed. Please call your doctor as soon as you notice any of these symptoms; do not wait until your next office visit. Recognize signs and symptoms of STROKE:    F-face looks uneven    A-arms unable to move or move unevenly    S-speech slurred or non-existent    T-time-call 911 as soon as signs and symptoms begin-DO NOT go       Back to bed or wait to see if you get better-TIME IS BRAIN. Warning Signs of HEART ATTACK     Call 911 if you have these symptoms:   Chest discomfort. Most heart attacks involve discomfort in the center of the chest that lasts more than a few minutes, or that goes away and comes back. It can feel like uncomfortable pressure, squeezing, fullness, or pain.  Discomfort in other areas of the upper body. Symptoms can include pain or discomfort in one or both arms, the back, neck, jaw, or stomach.    Shortness of breath with or without chest discomfort.  Other signs may include breaking out in a cold sweat, nausea, or lightheadedness. Don't wait more than five minutes to call 911 - MINUTES MATTER! Fast action can save your life. Calling 911 is almost always the fastest way to get lifesaving treatment. Emergency Medical Services staff can begin treatment when they arrive -- up to an hour sooner than if someone gets to the hospital by car. The discharge information has been reviewed with the patient and spouse. The patient and spouse verbalized understanding. Discharge medications reviewed with the patient and spouse and appropriate educational materials and side effects teaching were provided. ___________________________________________________________________________________________________________________________________    Patient armband removed and shredded.

## 2018-03-10 NOTE — MANAGEMENT PLAN
[facility]   Discharge Planning/ Assessment    Reasons for Intervention:  Interviewed patient. Verified demographics listed on face sheet with patient; all information correct. Pt has [employer] for insurance. Patient stated their PCP is Dr Marco A Foote and last appt Feb. Patient lives in single family home with wife. Patient's NOK is spouse, Maggie Apo at [phone] or [phone] Patient independent with ADLs prior to admission. No use of DME prior to admission. Discharge plan is Home.  States will manage teixeira and Lovenox shots as long as nurse teaches thoroughly before d/c  XINTEC RN BSN  Outcomes Manager  Pager # [phone]     High Risk Criteria  [] Yes  [x]No   Physician Referral  [] Yes  []No        Date    Nursing Referral  [] Yes  []No        Date    Patient/Family Request  [] Yes  []No        Date       Resources:    Medicare  [] Yes  []No   Medicaid  [] Yes  []No   No Resources  [] Yes  []No   Private Insurance  [x] Yes  []No    Name/Phone Number    Other  [] Yes  []No        (i.e. Workman's Comp)         Prior Services:    Prior Services  [] Yes  [x]No   Home Health  [] Yes  []No   [address]  [] Yes  []No        Number of [address]  [] Yes  []No       Meals on Wheels  [] Yes  []No   Office on Aging  [] Yes  []No   Transportation Services  [] Yes  []No   Nursing Home  [] Yes  []No        Nursing Home Name    [address]  [] Yes  []No        [address] Name    Other       Information Source:      Information obtained from  [x] Patient  [] Parent   [] [address]  [] Child  [] Spouse   [] Significant Other/Partner   [] Friend      [] EMS    [] Nursing Home Chart          [] Other:   Chart Review  [x] Yes  []No     Family/Support System:    Patient lives with  [] Alone    [x] Spouse   [] Significant Other  [] Children  [] Caretaker   [] Parent  [] Sibling     [] Other       Other Support System:    Is the patient responsible for care of others  [] Yes  [x]No   Information of person caring for patient on  discharge    Managers financial affairs independently  [x] Yes  []No   If no, explain:      Status Prior to Admission:    Mental Status  [x] Awake  [x] Alert  [x] Oriented  [] Quiet/Calm [] Lethargic/Sedated   [] Disoriented  [] Restless/Anxious  [] Combative   Personal Care  [] Dependent  [x] [address]  [] Requires Assistance   Meal Preparation Ability  [x] Independent   [] Standby Assistance   [] Minimal Assistance   [] Moderate Assistance  [] Maximum Assistance     [] Total Assistance   Chores  [x] Independent with Chores   [] [address] Resident   [] Requires Assistance   Bowel/Bladder  [x] Continent  [] Catheter  [] Incontinent  [] Ostomy Self-Care    [] Urine Diversion Self-Care  [] Maximum Assistance     [] Total Assistance   Number of Persons needed for assistance    DME at home  [] Carin Zambrano  [] Kimani Zambrano   [] Commode    [] Bathroom/Grab Bars  [] Hospital Bed  [] Nebulizer  [] Oxygen           [] Raised Toilet Seat  [] Shower Chair  [] Side Rails for Bed   [] Tub Transfer Bench   [] Denis oGnzalez  [] Alfred Murray      [] Other:   Vendor      Treatment Presently Receiving:    Current Treatments  [] Chemotherapy  [] Dialysis  [] Insulin  [] IVAB [x] IVF   [] O2  [] PCA   [] PT   [] RT   [] Tube Feedings   [] Wound Care     Psychosocial Evaluation:    Verbalized Knowledge of Disease Process  [] Patient  []Family   Coping with Disease Process  [] Patient  []Family   Requires Further Counseling Coping with Disease Process  [] Patient  []Family     Identified Projected Needs:    Home Health Aid  [] Yes  []No   Transportation  [] Yes  []No   Education  [] Yes  []No        Specific Education     Financial Counseling  [] Yes  []No   Inability to Care for Self/Will Require 24 hour care  [] Yes  []No   Pain Management  [] Yes  []No   Home Infusion Therapy  [] Yes  []No   Oxygen Therapy  [] Yes  []No   DME  [] Yes  []No   [address] Placement  [] Yes  []No   Rehab  [] Yes  []No   Physical Therapy  [] Yes  []No   Needs Anticipated At This Time  [] Yes  [x]No     Intra-Hospital Referral:    [address] [facility]  [] Yes  []No     [] Yes  []No   Patient Representative  [] Yes  []No   Staff for Teaching Needs  [] Yes  []No   Specialty Teaching Needs     Diabetic Educator  [] Yes  []No   Referral for Diabetic Educator Needed  [] Yes  []No  If Yes, place order for Nutritionist or Diabetic Consult     Tentative Discharge Plan:    Home with No Services  [x] Yes  []No   Home with [address]  [] Yes  []No        If Yes, specify type    [address]  [] Yes  []No        If Yes, specify type    Meals on Wheels  [] Yes  []No   Office of Aging  [] Yes  []No   NHP  [] Yes  []No   Return to the [address]  [] Yes  []No   Rehab Therapy  [] Yes  []No   Acute Rehab  [] Yes  []No   Subacute Rehab  [] Yes  []No   Private Care  [] Yes  []No   Substance Abuse Referral  [] Yes  []No   Transportation  [] Yes  []No   Chore Service  [] Yes  []No   Inpatient Hospice  [] Yes  []No   OP RT  [] Yes  [] No   OP Hemo  [] Yes  [] No   OP PT  [] Yes  []No   Support Group  [] Yes  []No   Reach to Recovery  [] Yes  []No   OP Oncology Clinic  [] Yes  []No   Clinic Appointment  [] Yes  []No   DME  [] Yes  []No   Comments    Name of D/C Planner or  Given to Patient or Family Norman Bella RN BSN  Outcomes Manager  Pager # [phone]   Phone Number         Extension    Date 3/10/2018   Time 1100   If you are discharged home, whom do you designate to participate in your discharge plan and receive any information needed?      Enter name of designee spouse        Phone # of designee         Address of designee         Updated         Patient refused to designate any           individual

## 2018-03-10 NOTE — PROGRESS NOTES
8508 - Bedside and Verbal shift change report given to Renee Sarabia RN  (oncoming nurse) by Philippa Aase, RN (offgoing nurse). Report included the following information SBAR, Kardex, Intake/Output and MAR.     0813 - prn meds given for pain rated 8/10. NAD noted, will cont to monitor. 8964 - pain reassessment performed. Pt rated pain 4/10. NAD noted, will cont to monitor. 1331 - prn pain meds given by Ana Zuniga, [address] - Spoke to Rosanna Shay,  regarding [facility] AT [facility] for lovenox & Dr. Desirae Andrea regarding rx confusion. 1400 - I have reviewed discharge instructions with the patient and spouse. The patient and spouse verbalized understanding.

## 2018-03-10 NOTE — PROGRESS NOTES
1925 Received pt in the bed resting Nasir@[employer] voiced no respiratory distress nor discomfort. No acute distress, call bell within reach will continue to monitor. 2230 Assisted to the bathroom trying to pass gas unable at this time. 0016 Assisted to bathroom ambulates and gait steady, tolerated with minimal discomfort. [address] ambulated in the hallway walks with steady gait no complaint. Back to bed and medicated for pain, call bell within reach, denied distress.

## 2018-03-10 NOTE — PROGRESS NOTES
[facility] Progress Note    HD#:   2  POD#:  1  Antibiotics:  periop    ASSESSMENT:     66 yo male s/p RARP doing well    Current creatinine:   0.72 (eGFR: >60)    PLAN:     -[facility] care teaching  -home after lunch with plans for voiding trial in ~7 days  -all questions answered to his level of satisfaction  -home on lovenox due to history of DVT after surgery in the past    SUBJECTIVE:     CC: Prostate cancer    HPI: Pain well controlled. Having bladder spasms. Wants to go home today. ECOG PS: 0    OBJECTIVE:     Visit Vitals    /74 (BP 1 Location: Left arm, BP Patient Position: At rest)    Pulse 87    Temp 98.9 °F (37.2 °C)    Resp 18    Ht 5' 10\" (1.778 m)    Wt 194 lb (88 kg)    SpO2 90%    BMI 27.84 kg/m2       Gen: Patient is in NAD   Pulm: Equal respiratory effort bilaterally   CV: regular rate   Abd: soft, nontender, non-distended; incision c/d/i  : urine clear and yellow  Ext: No clubbing, cyanosis or edema   Neuro: Grossly intact   Skin: warm and dry     Recent Results (from the past 24 hour(s))   METABOLIC PANEL, BASIC    Collection Time: 03/10/18  4:25 AM   Result Value Ref Range    Sodium 142 136 - 145 mmol/L    Potassium 3.9 3.5 - 5.5 mmol/L    Chloride 104 100 - 108 mmol/L    CO2 27 21 - 32 mmol/L    Anion gap 11 3.0 - 18 mmol/L    Glucose 107 (H) 74 - 99 mg/dL    BUN 12 7.0 - 18 MG/DL    Creatinine 0.72 0.6 - 1.3 MG/DL    BUN/Creatinine ratio 17 12 - 20      GFR est AA >60 >60 ml/min/1.73m2    GFR est non-AA >60 >60 ml/min/1.73m2    Calcium 8.0 (L) 8.5 - 10.1 MG/DL   CBC W/O DIFF    Collection Time: 03/10/18  4:25 AM   Result Value Ref Range    WBC 10.7 4.6 - 13.2 K/uL    RBC 4.26 (L) 4.70 - 5.50 M/uL    HGB 12.6 (L) 13.0 - 16.0 g/dL    HCT 37.7 36.0 - 48.0 %    MCV 88.5 74.0 - 97.0 FL    MCH 29.6 24.0 - 34.0 PG    MCHC 33.4 31.0 - 37.0 g/dL    RDW 13.2 11.6 - 14.5 %    PLATELET 676 392 - 094 K/uL    MPV 9.9 9.2 - 11.8 FL         Bishnu Fox MD, [address]   Urologic Oncology  [facility] [place]     of Urology  Department of Urology  [address].  [place], [address], [place]     Pager:  [phone]  Office:  [phone]

## 2018-03-10 NOTE — ACP (ADVANCE CARE PLANNING)
Patient has designated ___spouse_____________________ to participate in his/her discharge plan and to receive any needed information.      Name: Ajith lui pt  Phone number:[phone]

## 2018-03-13 NOTE — PROGRESS NOTES
Spoke to [employer] about his pathology report and sent copy to his PCP. He requested Oxycodone for pain since Tramadol is not working. He lives in [place], so will try to get someone at [facility] to prescribe 20 tabs.

## 2018-03-21 NOTE — ANCILLARY DISCHARGE INSTRUCTIONS
[facility]  Discharge Phone Call       After-Care Discharge Phone Call Questions: no answer     Were you able to get your prescriptions filled? Comment:      [] Yes  []No    Comment if answer is \"No\"   Are you taking your medication(s) as your doctor ordered? Do you understand the purpose of your medications? Comment:    [] Yes  []No    Comment if answer is \"No\"   Are you taking any other medications that are not on the list?  Comment:      [] Yes  []No    Comment if answer is \"Yes\"   Do you have any questions about your medications? Are you aware of potential side effects? Comment:    [] Yes  []No    Comment if answer is \"Yes\"   Did you make your follow-up appointments (if the hospital did not do this before  discharge)? Comment:    [] Yes  []No    Comment if answer is \"No\"   Is there any reason you might not be able to keep your follow-up appointments? Comment:     [] Yes  []No    Comment if answer is \"Yes\"   Do you have any questions about your care plan? Are you aware of what health problems to be alert for? Comment:    [] Yes  []No    Comment if answer is \"Yes\"   Do you have a good understanding of how you should manage your health? Comment:    [] Yes  []No    Comment if answer is \"Yes\"   Do you know which symptoms to watch for that would mean you would need to call your doctor right away? Comment:      [] Yes  []No    Comment if answer is \"No\"   Do you have any questions about the follow up process or any instructions that we have provided? Comment:    [] Yes  []No    Comment if answer is \"Yes\"   Did staff take your preferences into account?         [] Yes  []No    Comment if answer is \"Yes\"

## 2018-08-08 ENCOUNTER — DOCUMENTATION ONLY (OUTPATIENT)
Dept: ONCOLOGY | Age: 58
End: 2018-08-08

## 2018-08-08 NOTE — PROGRESS NOTES
Survivorship Care Plan mailed to patient with letter requesting pt call to review.  Faxed to PCP and scanned to [employer]

## 2018-08-22 ENCOUNTER — DOCUMENTATION ONLY (OUTPATIENT)
Dept: ONCOLOGY | Age: 58
End: 2018-08-22

## 2018-08-22 NOTE — PROGRESS NOTES
Reviewed Survivorship Care Plan with pt via phone. All questions answered. Encouraged pt to attend Prostate Support Group, reviewed time and location. Pt encouraged to call survivorship nurse for any further questions. Pt thankful and verbalized understanding.

## 2018-11-27 PROBLEM — N39.3 SUI (STRESS URINARY INCONTINENCE), MALE: Status: ACTIVE | Noted: 2018-11-27

## 2019-02-25 ENCOUNTER — ANESTHESIA EVENT (OUTPATIENT)
Dept: SURGERY | Age: 59
End: 2019-02-25
Payer: COMMERCIAL

## 2019-02-25 RX ORDER — ASPIRIN 81 MG/1
81 TABLET ORAL DAILY
COMMUNITY

## 2019-02-25 NOTE — PERIOP NOTES
PAT - SURGICAL PRE-ADMISSION INSTRUCTIONS    NAME:  Enedina Gibson                                                          TODAY'S DATE:  2/25/2019    SURGERY DATE:  2/26/2019                                  SURGERY ARRIVAL TIME:   1000    1. Do NOT eat or drink anything, including candy or gum, after MIDNIGHT on 02/25 , unless you have specific instructions from your Surgeon or Anesthesia Provider to do so. 2. No smoking on the day of surgery. 3. No alcohol 24 hours prior to the day of surgery. 4. No recreational drugs for one week prior to the day of surgery. 5. Leave all valuables, including money/purse, at home. 6. Remove all jewelry, nail polish, makeup (including mascara); no lotions, powders, deodorant, or perfume/cologne/after shave. 7. Glasses/Contact lenses and Dentures may be worn to the hospital.  They will be removed prior to surgery. 8. Call your doctor if symptoms of a cold or illness develop within 24 ours prior to surgery. 9. AN ADULT MUST DRIVE YOU HOME AFTER OUTPATIENT SURGERY. 10. If you are having an OUTPATIENT procedure, please make arrangements for a responsible adult to be with you for 24 hours after your surgery. 11. If you are admitted to the hospital, you will be assigned to a bed after surgery is complete. Normally a family member will not be able to see you until you are in your assigned bed. 15. Family is encouraged to accompany you to the hospital.  We ask visitors in the treatment area to be limited to ONE person at a time to ensure patient privacy. EXCEPTIONS WILL BE MADE AS NEEDED. 15. Children under 12 are discouraged from entering the treatment area and need to be supervised by an adult when in the waiting room. Special Instructions:    NONE. Patient Prep:    shower with anti-bacterial soap    These surgical instructions were reviewed with Donna Kasper during the PAT phone call. Directions:   On the morning of surgery, please go to the [facility]. Enter the building from the [facility] entrance, 1st floor (next to the Emergency Room entrance). Take the elevator to the 2nd floor. Sign in at the Registration Desk.     If you have any questions and/or concerns, please do not hesitate to call:  (Prior to the day of surgery)  [facility] unit:  [phone]  (Day of surgery)  [facility] unit:  [phone]

## 2019-02-26 ENCOUNTER — HOSPITAL ENCOUNTER (OUTPATIENT)
Age: 59
Setting detail: OUTPATIENT SURGERY
Discharge: HOME | End: 2019-02-26
Attending: UROLOGY | Admitting: UROLOGY
Payer: COMMERCIAL

## 2019-02-26 ENCOUNTER — ANESTHESIA (OUTPATIENT)
Dept: SURGERY | Age: 59
End: 2019-02-26
Payer: COMMERCIAL

## 2019-02-26 VITALS
HEART RATE: 82 BPM | WEIGHT: 199.5 LBS | TEMPERATURE: 97.5 F | SYSTOLIC BLOOD PRESSURE: 126 MMHG | HEIGHT: 71 IN | BODY MASS INDEX: 27.93 KG/M2 | DIASTOLIC BLOOD PRESSURE: 82 MMHG | RESPIRATION RATE: 14 BRPM | OXYGEN SATURATION: 94 %

## 2019-02-26 DIAGNOSIS — N39.3 SUI (STRESS URINARY INCONTINENCE), MALE: Primary | ICD-10-CM

## 2019-02-26 PROCEDURE — 74011250636 HC RX REV CODE- 250/636: Performed by: NURSE ANESTHETIST, CERTIFIED REGISTERED

## 2019-02-26 PROCEDURE — 77030010507 HC ADH SKN DERMBND J&J -B: Performed by: UROLOGY

## 2019-02-26 PROCEDURE — 77030018832 HC SOL IRR H20 ICUM -A: Performed by: UROLOGY

## 2019-02-26 PROCEDURE — 77030019927 HC TBNG IRR CYSTO BAXT -A: Performed by: UROLOGY

## 2019-02-26 PROCEDURE — 76210000020 HC REC RM PH II FIRST 0.5 HR: Performed by: UROLOGY

## 2019-02-26 PROCEDURE — 76060000034 HC ANESTHESIA 1.5 TO 2 HR: Performed by: UROLOGY

## 2019-02-26 PROCEDURE — 77030034768 HC RETRCTR HK 2LD BLNT DISP COOP -A: Performed by: UROLOGY

## 2019-02-26 PROCEDURE — 77030002966 HC SUT PDS J&J -A: Performed by: UROLOGY

## 2019-02-26 PROCEDURE — 76210000016 HC OR PH I REC 1 TO 1.5 HR: Performed by: UROLOGY

## 2019-02-26 PROCEDURE — 77030012510 HC MSK AIRWY LMA TELE -B: Performed by: ANESTHESIOLOGY

## 2019-02-26 PROCEDURE — 77030005515 HC CATH URETH FOL14 BARD -B: Performed by: UROLOGY

## 2019-02-26 PROCEDURE — 77030012884 HC SLV COMPR SCD MTEK -B: Performed by: UROLOGY

## 2019-02-26 PROCEDURE — 74011000250 HC RX REV CODE- 250

## 2019-02-26 PROCEDURE — 76010000153 HC OR TIME 1.5 TO 2 HR: Performed by: UROLOGY

## 2019-02-26 PROCEDURE — 77030013079 HC BLNKT BAIR HGGR 3M -A: Performed by: NURSE ANESTHETIST, CERTIFIED REGISTERED

## 2019-02-26 PROCEDURE — 77030002933 HC SUT MCRYL J&J -A: Performed by: UROLOGY

## 2019-02-26 PROCEDURE — 74011250636 HC RX REV CODE- 250/636: Performed by: UROLOGY

## 2019-02-26 PROCEDURE — 77030012961 HC IRR KT CYSTO/TUR ICUM -A: Performed by: UROLOGY

## 2019-02-26 PROCEDURE — 77030008064 HC RNG RETRCTR COOP -B: Performed by: UROLOGY

## 2019-02-26 PROCEDURE — 74011250637 HC RX REV CODE- 250/637: Performed by: NURSE ANESTHETIST, CERTIFIED REGISTERED

## 2019-02-26 PROCEDURE — 74011250636 HC RX REV CODE- 250/636

## 2019-02-26 PROCEDURE — 77030011267 HC ELECTRD BLD COVD -A: Performed by: UROLOGY

## 2019-02-26 PROCEDURE — 77030018836 HC SOL IRR NACL ICUM -A: Performed by: UROLOGY

## 2019-02-26 PROCEDURE — C1771 REP DEV, URINARY, W/SLING: HCPCS | Performed by: UROLOGY

## 2019-02-26 PROCEDURE — 77030018673: Performed by: UROLOGY

## 2019-02-26 DEVICE — MALE SLING SYSTEM
Type: IMPLANTABLE DEVICE | Site: BLADDER | Status: FUNCTIONAL
Brand: ADVANCE™ XP

## 2019-02-26 RX ORDER — SODIUM CHLORIDE 0.9 % (FLUSH) 0.9 %
5-40 SYRINGE (ML) INJECTION AS NEEDED
Status: DISCONTINUED | OUTPATIENT
Start: 2019-02-26 | End: 2019-02-26 | Stop reason: HOSPADM

## 2019-02-26 RX ORDER — INSULIN LISPRO 100 [IU]/ML
INJECTION, SOLUTION INTRAVENOUS; SUBCUTANEOUS ONCE
Status: DISCONTINUED | OUTPATIENT
Start: 2019-02-26 | End: 2019-02-26 | Stop reason: HOSPADM

## 2019-02-26 RX ORDER — DEXTROSE MONOHYDRATE 25 G/50ML
25-50 INJECTION, SOLUTION INTRAVENOUS AS NEEDED
Status: DISCONTINUED | OUTPATIENT
Start: 2019-02-26 | End: 2019-02-26 | Stop reason: HOSPADM

## 2019-02-26 RX ORDER — HYDROMORPHONE HYDROCHLORIDE 2 MG/ML
0.5 INJECTION, SOLUTION INTRAMUSCULAR; INTRAVENOUS; SUBCUTANEOUS
Status: DISCONTINUED | OUTPATIENT
Start: 2019-02-26 | End: 2019-02-26 | Stop reason: HOSPADM

## 2019-02-26 RX ORDER — SODIUM CHLORIDE, SODIUM LACTATE, POTASSIUM CHLORIDE, CALCIUM CHLORIDE 600; 310; 30; 20 MG/100ML; MG/100ML; MG/100ML; MG/100ML
50 INJECTION, SOLUTION INTRAVENOUS CONTINUOUS
Status: DISCONTINUED | OUTPATIENT
Start: 2019-02-26 | End: 2019-02-26 | Stop reason: HOSPADM

## 2019-02-26 RX ORDER — LIDOCAINE HYDROCHLORIDE 20 MG/ML
INJECTION, SOLUTION EPIDURAL; INFILTRATION; INTRACAUDAL; PERINEURAL AS NEEDED
Status: DISCONTINUED | OUTPATIENT
Start: 2019-02-26 | End: 2019-02-26 | Stop reason: HOSPADM

## 2019-02-26 RX ORDER — OXYCODONE HYDROCHLORIDE 5 MG/1
10 TABLET ORAL AS NEEDED
Status: COMPLETED | OUTPATIENT
Start: 2019-02-26 | End: 2019-02-26

## 2019-02-26 RX ORDER — CEFAZOLIN SODIUM 2 G/50ML
2 SOLUTION INTRAVENOUS
Status: COMPLETED | OUTPATIENT
Start: 2019-02-26 | End: 2019-02-26

## 2019-02-26 RX ORDER — FENTANYL CITRATE 50 UG/ML
INJECTION, SOLUTION INTRAMUSCULAR; INTRAVENOUS AS NEEDED
Status: DISCONTINUED | OUTPATIENT
Start: 2019-02-26 | End: 2019-02-26 | Stop reason: HOSPADM

## 2019-02-26 RX ORDER — DIPHENHYDRAMINE HYDROCHLORIDE 50 MG/ML
12.5 INJECTION, SOLUTION INTRAMUSCULAR; INTRAVENOUS
Status: DISCONTINUED | OUTPATIENT
Start: 2019-02-26 | End: 2019-02-26 | Stop reason: HOSPADM

## 2019-02-26 RX ORDER — MAGNESIUM SULFATE 100 %
4 CRYSTALS MISCELLANEOUS AS NEEDED
Status: DISCONTINUED | OUTPATIENT
Start: 2019-02-26 | End: 2019-02-26 | Stop reason: HOSPADM

## 2019-02-26 RX ORDER — OXYCODONE HYDROCHLORIDE 5 MG/1
5 TABLET ORAL
Status: DISCONTINUED | OUTPATIENT
Start: 2019-02-26 | End: 2019-02-26 | Stop reason: HOSPADM

## 2019-02-26 RX ORDER — OXYCODONE HYDROCHLORIDE 5 MG/1
5 CAPSULE ORAL
Qty: 20 CAP | Refills: 0 | Status: SHIPPED | OUTPATIENT
Start: 2019-02-26 | End: 2019-03-03

## 2019-02-26 RX ORDER — SODIUM CHLORIDE 0.9 % (FLUSH) 0.9 %
5-40 SYRINGE (ML) INJECTION EVERY 8 HOURS
Status: DISCONTINUED | OUTPATIENT
Start: 2019-02-26 | End: 2019-02-26 | Stop reason: HOSPADM

## 2019-02-26 RX ORDER — SODIUM CHLORIDE, SODIUM LACTATE, POTASSIUM CHLORIDE, CALCIUM CHLORIDE 600; 310; 30; 20 MG/100ML; MG/100ML; MG/100ML; MG/100ML
100 INJECTION, SOLUTION INTRAVENOUS CONTINUOUS
Status: DISCONTINUED | OUTPATIENT
Start: 2019-02-26 | End: 2019-02-26 | Stop reason: HOSPADM

## 2019-02-26 RX ORDER — ONDANSETRON 2 MG/ML
INJECTION INTRAMUSCULAR; INTRAVENOUS AS NEEDED
Status: DISCONTINUED | OUTPATIENT
Start: 2019-02-26 | End: 2019-02-26 | Stop reason: HOSPADM

## 2019-02-26 RX ORDER — KETOROLAC TROMETHAMINE 10 MG/1
10 TABLET, FILM COATED ORAL
Qty: 40 TAB | Refills: 0 | Status: SHIPPED | OUTPATIENT
Start: 2019-02-26 | End: 2019-03-11 | Stop reason: SDUPTHER

## 2019-02-26 RX ORDER — HEPARIN SODIUM 5000 [USP'U]/ML
5000 INJECTION, SOLUTION INTRAVENOUS; SUBCUTANEOUS ONCE
Status: COMPLETED | OUTPATIENT
Start: 2019-02-26 | End: 2019-02-26

## 2019-02-26 RX ORDER — MIDAZOLAM HYDROCHLORIDE 1 MG/ML
INJECTION, SOLUTION INTRAMUSCULAR; INTRAVENOUS AS NEEDED
Status: DISCONTINUED | OUTPATIENT
Start: 2019-02-26 | End: 2019-02-26 | Stop reason: HOSPADM

## 2019-02-26 RX ORDER — EPHEDRINE SULFATE 50 MG/ML
INJECTION, SOLUTION INTRAVENOUS AS NEEDED
Status: DISCONTINUED | OUTPATIENT
Start: 2019-02-26 | End: 2019-02-26 | Stop reason: HOSPADM

## 2019-02-26 RX ORDER — FAMOTIDINE 20 MG/1
20 TABLET, FILM COATED ORAL
Status: COMPLETED | OUTPATIENT
Start: 2019-02-26 | End: 2019-02-26

## 2019-02-26 RX ORDER — PROPOFOL 10 MG/ML
INJECTION, EMULSION INTRAVENOUS AS NEEDED
Status: DISCONTINUED | OUTPATIENT
Start: 2019-02-26 | End: 2019-02-26 | Stop reason: HOSPADM

## 2019-02-26 RX ADMIN — LIDOCAINE HYDROCHLORIDE 100 MG: 20 INJECTION, SOLUTION EPIDURAL; INFILTRATION; INTRACAUDAL; PERINEURAL at 12:19

## 2019-02-26 RX ADMIN — FENTANYL CITRATE 25 MCG: 50 INJECTION, SOLUTION INTRAMUSCULAR; INTRAVENOUS at 12:39

## 2019-02-26 RX ADMIN — FENTANYL CITRATE 50 MCG: 50 INJECTION, SOLUTION INTRAMUSCULAR; INTRAVENOUS at 12:13

## 2019-02-26 RX ADMIN — FENTANYL CITRATE 50 MCG: 50 INJECTION, SOLUTION INTRAMUSCULAR; INTRAVENOUS at 12:51

## 2019-02-26 RX ADMIN — PROPOFOL 160 MG: 10 INJECTION, EMULSION INTRAVENOUS at 12:19

## 2019-02-26 RX ADMIN — FAMOTIDINE 20 MG: 20 TABLET ORAL at 11:22

## 2019-02-26 RX ADMIN — CEFAZOLIN SODIUM 2 G: 2 SOLUTION INTRAVENOUS at 11:39

## 2019-02-26 RX ADMIN — FENTANYL CITRATE 50 MCG: 50 INJECTION, SOLUTION INTRAMUSCULAR; INTRAVENOUS at 12:23

## 2019-02-26 RX ADMIN — OXYCODONE HYDROCHLORIDE 10 MG: 5 TABLET ORAL at 14:13

## 2019-02-26 RX ADMIN — SODIUM CHLORIDE, SODIUM LACTATE, POTASSIUM CHLORIDE, AND CALCIUM CHLORIDE: 600; 310; 30; 20 INJECTION, SOLUTION INTRAVENOUS at 13:06

## 2019-02-26 RX ADMIN — MIDAZOLAM HYDROCHLORIDE 2 MG: 1 INJECTION, SOLUTION INTRAMUSCULAR; INTRAVENOUS at 12:13

## 2019-02-26 RX ADMIN — EPHEDRINE SULFATE 10 MG: 50 INJECTION, SOLUTION INTRAVENOUS at 13:30

## 2019-02-26 RX ADMIN — SODIUM CHLORIDE, SODIUM LACTATE, POTASSIUM CHLORIDE, AND CALCIUM CHLORIDE 50 ML/HR: 600; 310; 30; 20 INJECTION, SOLUTION INTRAVENOUS at 11:36

## 2019-02-26 RX ADMIN — HYDROMORPHONE HYDROCHLORIDE 0.5 MG: 2 INJECTION INTRAMUSCULAR; INTRAVENOUS; SUBCUTANEOUS at 13:57

## 2019-02-26 RX ADMIN — ONDANSETRON 4 MG: 2 INJECTION INTRAMUSCULAR; INTRAVENOUS at 13:20

## 2019-02-26 RX ADMIN — HEPARIN SODIUM 5000 UNITS: 5000 INJECTION, SOLUTION INTRAVENOUS; SUBCUTANEOUS at 13:26

## 2019-02-26 RX ADMIN — FENTANYL CITRATE 25 MCG: 50 INJECTION, SOLUTION INTRAMUSCULAR; INTRAVENOUS at 12:30

## 2019-02-26 RX ADMIN — EPHEDRINE SULFATE 10 MG: 50 INJECTION, SOLUTION INTRAVENOUS at 13:26

## 2019-02-26 NOTE — OP NOTES
Operative Note    2/26/2019    Patient: Micah Dance               Sex: male             MRN: [id number]      YOB: 1960      Age:  63 y.o. Preoperative Diagnosis: n13.93 stress urinary incontinence    Postoperative Diagnosis:  n13.93 stress urinary incontinence    Surgeon: Surgeon(s) and Role:     * Liane Dia MD - Primary     * Michael Bernardo MD - Resident - Assisting    Anesthesia:  General    Indications for surgery:     Procedure:  Procedure(s):  cystoscopy, advance xp male sling placement    Findings: Cystourethroscopy: Smooth urethral mucosa, No stricture noted, vesicourethral anastomosis wide open. Bladder wall with trabeculation, no lesion or pathology of concern. Bilateral ureteral orifices orthotopic and grossly normal. Under direct vision, the sling was tensioned. Coulter Class was significant and nice coaptation.  We did not over-tension the sling.  The scope was passed through after tensioning maneuver. Estimated Blood Loss:   < 10 ml           Implants:   Implant Name Type Inv.  Item Serial No.  Lot No. LRB No. Used Action   ADVANCE XP    [employer] [id number] N/A 1 Implanted       Specimens: * No specimens in log *       INDICATIONS FOR PROCEDURE:  The patient is a pleasant 67-year-old gentleman with a history of postprostatectomy incontinence.  For full history and physical, please see chart.     SPECIMENS REMOVED:  None.     DISPOSITION:  The patient tolerated the procedure well and went to the recovery room in stable condition.     Procedure in Detail:   The patient was correctly identified, brought to the operating room, placed in the supine position, given general anesthesia.  Moved on the operating table, placed in dorsal lithotomy position.  His perineum, penis, and lower abdomen were prepped and draped.  Prior to the induction of anesthesia, IV antibiotics and SC stockings were placed and cycled.  A time-out was performed, we all agreed.     A midline perineal incision was made.  Dissection was carried down to the bulbospongiosus muscle which was then opened up in the midline.  The corpus spongiosum was identified.  The area of the central tendon was marked.  Lateral aspects of both spongiosum were freed.  The central tendon was taken down with sharp dissection.  Distally, we mobilized the corpus spongiosum as well.  The [place] retractor was removed.  Two small incisions were made below the abductor longus.  We passed our AdVance XP needles from our external incision into our internal incision, attached these to the mesh and then brought these back through bilaterally.  The mesh was secured to the corpus spongiosum in the area where the central tendon was marked.  The sling was placed in the appropriate position.  Flexible cystoscopy confirmed no evidence of urethral injury, no evidence of bladder stones, tumors, or lesions.  Under direct vision, the sling was tensioned. Darral Medal was significant and nice coaptation.  We did not over-tension the sling.  The scope was passed re-through.  The scope was then removed.  A 14-Kenyan catheter was placed.  The wound was irrigated with  irrigant.  The bulbous spongiosum muscle was closed with interrupted 3-0 Vicryl.  The subcutaneous layer was closed with continuous 3-0 Vicryl.  The skin was closed with running 4-0 Monocryl.  Excess mesh was excised, and the outer covering of the mesh was removed.  The patient was awakened from anesthesia and taken to the recovery room in stable condition.     Drains: 14Fr teixeira catheter           Complications:  None           Counts: Sponge and needle counts were correct times two. Plan: Mr. Merlinda Irani has an appointment to see me after 3 days for teixeira removal and early postop follow-up, at [address] [place] [address]. Rod Newton, [address] [facility]    Bart Saint, MD [address] Fellow  2/26/2019    I performed the above operation.  I agree with the note and made amendments to reflect my own.      Luna Bautista MD  [facility]  A Division of [facility]

## 2019-02-26 NOTE — PERIOP NOTES
Pre-Op Summary    Pt arrived via car with family/friend and is oriented to time, place, person and situation. Patient with steady gait with none assistive devices. Visit Vitals  BP (!) 133/93 (BP 1 Location: Left arm, BP Patient Position: At rest)   Pulse 85   Temp 98.3 °F (36.8 °C)   Resp 16   Ht 5' 11\" (1.803 m)   Wt 90.5 kg (199 lb 8 oz)   SpO2 95%   BMI 27.82 kg/m²       Peripheral IV located on Left forearm. Patients belongings are located given to wife. Patient's point of contact is Eleuterio Myrick, wife and their contact number is: [phone]. They will be in the waiting room. They are able to receive medication information. They will be their ride home.

## 2019-02-26 NOTE — ANESTHESIA PREPROCEDURE EVALUATION
Anesthetic History No history of anesthetic complications Review of Systems / Medical History Patient summary reviewed and pertinent labs reviewed Pulmonary Neuro/Psych Cardiovascular GI/Hepatic/Renal 
  
GERD Endo/Other Arthritis Other Findings Physical Exam 
 
Airway Mallampati: II 
TM Distance: 4 - 6 cm Neck ROM: normal range of motion Mouth opening: Normal 
 
 Cardiovascular Rhythm: regular Rate: normal 
 
 
 
 Dental 
 
Dentition: Poor dentition Pulmonary Breath sounds clear to auscultation Abdominal 
GI exam deferred Other Findings Anesthetic Plan ASA: 2 Anesthesia type: general 
 
 
 
 
Induction: Intravenous Anesthetic plan and risks discussed with: Patient

## 2019-02-26 NOTE — H&P
PREOPERATIVE H&P    PLAN:    Consent secured. Risk and benefit reiterated. TO Proceed with the contemplated procedure  Preop urine CS negative,  ANCEF on-call to OR    ASSESSMENT:       ICD-10-CM ICD-9-CM     1. CADEN (stress urinary incontinence), male N39.3 788.32 CYSTOURETHROSCOPY         REFERRAL TO PHYSICAL THERAPY ([facility])         tamsulosin (FLOMAX) 0.4 mg capsule   2. Other male erectile dysfunction N52.8 607.84        UDS 1/4/19 reviewed: Per CMG patient has a small capacity compliant bladder with delayed sensations. DO noted at a volume of 180 ml and near capacity with no leakage. Patient generated a prolonged high pressure detrusor contraction with no ability to void. After the patient's position was changed from sitting to standing he voided by gravity and no EUS activity, intermittent flow, double voided and he was able to empty.       DISCUSSION IN OFFICE:  1. UDS reviewed with the patient. 2. Cysto today revealed wide open VUAS, reserved sphincter mechanism. 3. Discussed option of urethral slings vs AUS, r/b and postop expectations of both outlined. Also explained the use of urethral bulking agents. 4. Patient desires urethral sling. To OR for Advance Male Sling placement, surgery letter sent. 5. Start Flomax 0.4mg daily, rx sent  6. Recommend revisiting PFPT, referral placed. Maintain consistency with HEP   7. Continue daily Cialis 5mg for penile rehabilitation   8. Patient's BMI is out of the normal parameters. Information about BMI was given and patient was advised to follow-up with their PCP for further management.     Follow-up Disposition:  Return for Will call to schedule procedure.      DISCUSSION:  The types of urinary incontinence were reviewed with patient, including stress, urge, mixed, overflow, and functional components. Male pelvic anatomy was reviewed as well. Treatment options were then reviewed with the patient, given his primarily CADEN.  For urge component, we discussed doing nothing, behavioral therapy, physical therapy, anti-muscarinics, and stimulator implants (Interstim). For stress component, we discussed doing nothing, behavioral therapy, physical therapy, collagen injections, and surgical intervention in the form of urethral slings vs AUS. Risks and benefits of each treatment option were provided.          Chief Complaint   Patient presents with    Stress Incontinence       Patient presents today for a review of recent UDS testing completed on 1/4/19 and to further evaluate with possible in office cysto. Per patient he denies having any s/s of an urinary infection and was unable to provide in office specimen today, due to prior void.       HISTORY OF PRESENT ILLNESS:  Merlinda Irani is a 63 y.o. male who presents today in follow up for PPI (postprostatectomy incontinence).      Presents today for UDS review and cysto. Continues to report bothersome stress urinary incontinence. He is using roughly 6-8 ppd. Performing kegel exercises daily. Incontinence interferes with daily activities and continues to cause strain in his  life. Additionally states he is unable to have sexual intercourse due to leakage. His wife and he would like to resume sexual activity. Denies flank pain, gross hematuria, dysuria and is asymptomatic for infection today. No f/c/n/v.      PRIOR HISTORY:  Initial visit 11/28/18: Pt with pT2N0(0/4)M0R0 GS 3+4 prostate cancer s/p RALP with BPLND and BNS on 03/09/18 with Dr. Rochelle Chu. No radiation. Patient is struggling with JEM. Initially post-op, he was utilizing 1 PPD, but incontinence significantly worse over the last few months. He is now using 6-8 PPD. He performs HEP consistently. Patient leaks with most movements, and mentions psychologic triggers for urgency such as cold and water. No flank pain, dysuria, or gross hematuria. No recent UTIs.    States that incontinence is causing strain in his marriage, and is significantly affecting social aspects and QOL. He has done some research, and is interested in sling placement.      In regards to ED, he is using Viagra 100mg PRN for intercourse and can obtain a partial erection.  States that he rarely attempts intercourse due to severe incontinence.      Flank pain: NO  Decreased output: NO  Difficulty voiding: NO  Burning with urination: NO  Notable blood in urine:  NO  Incontinence:  NO  Nocturia:   YES       Review of Systems  Constitutional: Fever: No  Skin: Rash: No  HEENT: Hearing difficulty: No  Eyes: Blurred vision: No  Cardiovascular: Chest pain: No  Respiratory: Shortness of breath: No  Gastrointestinal: Nausea/vomiting: No  Musculoskeletal: Back pain: No  Neurological: Weakness: No  Psychological: Memory loss: No  Comments/additional findings:           Past Medical History:   Diagnosis Date    Arthritis      Chronic pain       hip    Hiatal hernia      Ill-defined condition       blood clot left groin    Labral tear of hip, degenerative 12/11/2016    Malignant neoplasm of prostate (HCC)      Migraines      Prostate cancer ([address]      Thromboembolus ([address]       left leg         Past Surgical History:   Procedure Laterality Date    HX BACK SURGERY         twice two cages and screws    HX CARPAL TUNNEL RELEASE        HX CERVICAL FUSION         5-7    HX KNEE ARTHROSCOPY         torn meniscus left    HX KNEE ARTHROSCOPY         torn ligament left    HX KNEE ARTHROSCOPY         right meniscus twice    HX LUMBAR FUSION         L3-5 and S1-S2    HX ORTHOPAEDIC         bilateral elbow muscle removed from joint    HX ORTHOPAEDIC         ctr left    HX ORTHOPAEDIC         left singleton neuroma    HX ORTHOPAEDIC         plantar fasciatis left    HX ORTHOPAEDIC         right cyst removed right foot    HX RADICAL PROSTATECTOMY   03/09/2018     RALP, PLND    HX ROTATOR CUFF REPAIR         right with torn labrum and bicep    TOTAL KNEE ARTHROPLASTY         left     TOTAL KNEE ARTHROPLASTY         partial right         Social History            Tobacco Use    Smoking status: Former Smoker       Packs/day: 0.50       Years: 25.00       Pack years: 12.50       Last attempt to quit: 2014       Years since quittin.1    Smokeless tobacco: Never Used   Substance Use Topics    Alcohol use: No    Drug use: No               Allergies   Allergen Reactions    Codeine Nausea and Vomiting    Tylenol [Acetaminophen] Other (comments)       2/t liver function                  Family History   Problem Relation Age of Onset    Other Mother           aneurysm with associated brain tumor    Dementia Mother      No Known Problems Father                  Current Outpatient Medications   Medication Sig Dispense Refill    clarithromycin (BIAXIN) 500 mg tablet          pantoprazole (PROTONIX) 40 mg tablet 40 mg.        sucralfate (CARAFATE) 1 gram tablet Take 1 g by mouth four (4) times daily.        methylPREDNISolone (MEDROL DOSEPACK) 4 mg tablet Take  by mouth.        tamsulosin (FLOMAX) 0.4 mg capsule Take 1 Cap by mouth daily (after dinner). 90 Cap 3    OTHER,NON-FORMULARY, Tadalafil 6 mg tablets - Patient to take 1 tab PO daily. Max 1 tab daily- fax to [phone] 90 Each 3    omeprazole (PRILOSEC) 40 mg capsule TAKE 1 CAPSULE BY MOUTH TWO TIMES A DAY 30 MINS BEFORE BREAKFAST AND DINNER   3    raNITIdine (ZANTAC) 300 mg tab Take  by mouth two (2) times a day.        traMADol (ULTRAM) 50 mg tablet Take 1 tablet twice a day by oral route as needed.        multivitamin, tx-iron-ca-min (THERA-M W/ IRON) 9 mg iron-400 mcg tab tablet Take 1 Tab by mouth daily.        sildenafil citrate (VIAGRA) 100 mg tablet Take one tab daily as needed.  18 Tab 0    naproxen (NAPROSYN) 500 mg tablet TAKE 1 TABLET BY MOUTH TWICE A DAY   0    traMADol (ULTRAM-ER) 100 mg Tb24 Take 1 tablet every day by oral route in the evening.             PHYSICAL EXAMINATION:   Visit Vitals  /90   Ht 5' 10\" (1.778 m)   Wt 203 lb (92.1 kg)   BMI 29.13 kg/m²      Constitutional: WDWN, Pleasant and appropriate affect, No acute distress.       REVIEW OF LABS AND IMAGING:           Results for orders placed or performed in visit on 02/15/19   CYSTOURETHROSCOPY     Impression     CYSTOSCOPY PROCEDURE           Patient Name: Oskar Callejas            Date of Procedure: 2/15/2019      Pre-procedure Diagnosis:   Barbara Anderson (stress urinary incontinence), male  (primary encounter diagnosis)  Other male erectile dysfunction      Post-procedure Diagnosis: Same.     Consent: All risks, benefits and options were reviewed in detail and the patient agrees to procedure. Risks include but are not limited to bleeding, infection, sepsis, death, dysuria and others.      Universal Procedure Pause:  1. Correct patient confirmed:  yes  2. Allergies confirmed:  yes  3. Patient taking antiplatelet medications:  no  4. Patient taking anticoagulants:  no  5. Correct procedure and side confirmed and consent signed:  yes  6. Correct antibiotics confirmed:  yes     Procedure: The patient was placed in the supine position, and prepped and draped in the normal fashion. 5 ml of 4% Lidocaine gel was placed in the urethra. Once adequate anesthesia was achieved; the flexible cystoscope was placed into the urethra and then into the bladder. Findings as follows:       Meatus: normal  Urethra: normal  Bladder neck: wide open VUAS compatible with excellent surgery by Dr. Dano Talbot. Reserved sphincter mechanism. Trigone:  normal  Trabeculation:2+  Diverticuli:  none  Lesion:  none     Antibiotic provided:   Yes      Lab / Imaging:   No results found for any visits on 02/15/19.      Diagnoses:   Barbara Anderson (stress urinary incontinence), male  (primary encounter diagnosis)  Other male erectile dysfunction      SUMMARY OF VISIT AND PLAN:  See separate office note.      Kierra Malik MD                        UDS 1/4/19:     Cystometrogram         Sensation Delayed    First sensation 127 ml Strong desire 162 ml    Capacity 246 ml    Compliance: normal       Detrusor overactivity:yes       Leakage: no         Micturition         Voided Volume: 285 ml    Detrusor Pressure: At peak flow rate: 71 cm H2O Maximum: 82 cm H2O   Duration Contraction: 7 minutes interrupted     Post Void Contraction: no     Valsalva: no   External Sphincter: relaxed   Max Flow Rate: 0.6 ml/sec   Average Flow Rate 0.7 ml/sec   Voiding Time: 2.6 minutes minutes   Flow Pattern: intermittent   Post Void Residual: <10 ml       Comments: Per CMG patient has a small capacity compliant bladder with delayed sensations. DO noted at a volume of 180 ml and near capacity with no leakage. Patient generated a prolonged high pressure detrusor contraction with no ability to void. After the patient's position was changed from sitting to standing he voided by gravity and no EUS activity, intermittent flow, double voided and he was able to empty.           I was present and have independently reviewed the diagnosis and discussed the plan with Rachael Ang MD [facility] [id number], [address] [facility] Fellow, and I agree. Zahraa Monterroso MD   [facility]  [address] [place] [address].  [address] [facility]

## 2019-02-26 NOTE — DISCHARGE INSTRUCTIONS
DISCHARGE SUMMARY from Nurse    PATIENT INSTRUCTIONS:    After general anesthesia or intravenous sedation, for 24 hours or while taking prescription Narcotics:  · Limit your activities  · Do not drive and operate hazardous machinery  · Do not make important personal or business decisions  · Do  not drink alcoholic beverages  · If you have not urinated within 8 hours after discharge, please contact your surgeon on call. Report the following to your surgeon:  · Excessive pain, swelling, redness or odor of or around the surgical area  · Temperature over 100.5  · Nausea and vomiting lasting longer than 4 hours or if unable to take medications  · Any signs of decreased circulation or nerve impairment to extremity: change in color, persistent  numbness, tingling, coldness or increase pain  · Any questions    What to do at Home:  . These are general instructions for a healthy lifestyle:    No smoking/ No tobacco products/ Avoid exposure to second hand smoke  Surgeon General's Warning:  Quitting smoking now greatly reduces serious risk to your health. Obesity, smoking, and sedentary lifestyle greatly increases your risk for illness    A healthy diet, regular physical exercise & weight monitoring are important for maintaining a healthy lifestyle    You may be retaining fluid if you have a history of heart failure or if you experience any of the following symptoms:  Weight gain of 3 pounds or more overnight or 5 pounds in a week, increased swelling in our hands or feet or shortness of breath while lying flat in bed. Please call your doctor as soon as you notice any of these symptoms; do not wait until your next office visit. Recognize signs and symptoms of STROKE:    F-face looks uneven    A-arms unable to move or move unevenly    S-speech slurred or non-existent    T-time-call 911 as soon as signs and symptoms begin-DO NOT go       Back to bed or wait to see if you get better-TIME IS BRAIN.     Warning Signs of HEART ATTACK     Call 911 if you have these symptoms:   Chest discomfort. Most heart attacks involve discomfort in the center of the chest that lasts more than a few minutes, or that goes away and comes back. It can feel like uncomfortable pressure, squeezing, fullness, or pain.  Discomfort in other areas of the upper body. Symptoms can include pain or discomfort in one or both arms, the back, neck, jaw, or stomach.  Shortness of breath with or without chest discomfort.  Other signs may include breaking out in a cold sweat, nausea, or lightheadedness. Don't wait more than five minutes to call 911 - MINUTES MATTER! Fast action can save your life. Calling 911 is almost always the fastest way to get lifesaving treatment. Emergency Medical Services staff can begin treatment when they arrive -- up to an hour sooner than if someone gets to the hospital by car. The discharge information has been reviewed with the patient. The patient verbalized understanding. Discharge medications reviewed with the patient and appropriate educational materials and side effects teaching were provided. ___________________________________________________________________________________________________________________________________  Patient armband removed and given to patient to take home.   Patient was informed of the privacy risks if armband lost or stolen

## 2019-02-26 NOTE — ANESTHESIA POSTPROCEDURE EVALUATION
Procedure(s): 
cystoscopy, advance xp male sling placement. Anesthesia Post Evaluation Multimodal analgesia: multimodal analgesia used between 6 hours prior to anesthesia start to PACU discharge Patient location during evaluation: bedside Patient participation: complete - patient participated Level of consciousness: awake Pain management: adequate Airway patency: patent Anesthetic complications: no 
Cardiovascular status: stable Respiratory status: acceptable Hydration status: acceptable Post anesthesia nausea and vomiting:  controlled Visit Vitals /83 Pulse 86 Temp 36.4 °C (97.5 °F) Resp 14 Ht 5' 11\" (1.803 m) Wt 90.5 kg (199 lb 8 oz) SpO2 96% BMI 27.82 kg/m²

## 2019-03-14 RX ORDER — TRAMADOL HYDROCHLORIDE 50 MG/1
50 TABLET ORAL
COMMUNITY
End: 2020-04-08

## 2019-03-14 RX ORDER — TRAMADOL HYDROCHLORIDE 100 MG/1
100 TABLET, FILM COATED, EXTENDED RELEASE ORAL DAILY
COMMUNITY

## 2019-03-19 ENCOUNTER — HOSPITAL ENCOUNTER (OUTPATIENT)
Age: 59
Setting detail: OUTPATIENT SURGERY
Discharge: HOME | End: 2019-03-19
Attending: INTERNAL MEDICINE | Admitting: INTERNAL MEDICINE
Payer: COMMERCIAL

## 2019-03-19 VITALS
DIASTOLIC BLOOD PRESSURE: 96 MMHG | WEIGHT: 203 LBS | TEMPERATURE: 98.4 F | HEIGHT: 70 IN | RESPIRATION RATE: 18 BRPM | OXYGEN SATURATION: 98 % | BODY MASS INDEX: 29.06 KG/M2 | SYSTOLIC BLOOD PRESSURE: 154 MMHG | HEART RATE: 76 BPM

## 2019-03-19 PROCEDURE — 91010 ESOPHAGUS MOTILITY STUDY: CPT | Performed by: INTERNAL MEDICINE

## 2019-03-19 PROCEDURE — 74011000250 HC RX REV CODE- 250: Performed by: INTERNAL MEDICINE

## 2019-03-19 RX ORDER — LIDOCAINE HYDROCHLORIDE 20 MG/ML
JELLY TOPICAL AS NEEDED
Status: DISCONTINUED | OUTPATIENT
Start: 2019-03-19 | End: 2019-03-19 | Stop reason: HOSPADM

## 2019-03-19 RX ADMIN — LIDOCAINE HYDROCHLORIDE: 20 JELLY TOPICAL at 08:02

## 2019-03-19 NOTE — PERIOP NOTES
5cc 2% lidocaine jelly inhaled into right nare per MD orders. Probe inserted into  right nare without difficulty. Pt tolerated procedure well.

## 2019-03-19 NOTE — DISCHARGE INSTRUCTIONS
MANOMETRY DISCHARGE INSTRUCTION    You may resume your regular diet as tolerated. You may resume your normal daily activities. If you develop a sore throat- throat lozenges or warm salt water gargles will help. Call your Physician if you have any complications or questions. How can you care for yourself at home? Activity  · Rest as much as you need to after you go home. · You should be able to go back to your usual activities the day after the test.  Diet  · Follow your doctor's directions for eating after the test.  · Drink plenty of fluids (unless your doctor has told you not to). Medications  · If you have a sore throat the day after the test, use an over-the-counter spray to numb your throat. Follow-up care is a key part of your treatment and safety. Be sure to make and go to all appointments, and call your doctor if you are having problems. It's also a good idea to know your test results and keep a list of the medicines you take. When should you call for help? Call 911 anytime you think you may need emergency care. For example, call if:  · You passed out (lost consciousness). · You cough up blood. · You vomit blood or what looks like coffee grounds. · You pass maroon or very bloody stools. Call your doctor now or seek immediate medical care if:  · You have trouble swallowing. · You have belly pain. · Your stools are black and tarlike or have streaks of blood. · You are sick to your stomach or cannot keep fluids down. Watch closely for changes in your health, and be sure to contact your doctor if:  · Your throat still hurts after a day or two. · You do not get better as expected. Where can you learn more? Go to Backpack.be  Enter J454 in the search box to learn more about \"Upper GI Endoscopy: What to Expect at Home. \"   © 5715-0275 [employer].  Care instructions adapted under license by [facility] (which disclaims liability or warranty for this information). This care instruction is for use with your licensed healthcare professional. If you have questions about a medical condition or this instruction, always ask your healthcare professional. [address] any warranty or liability for your use of this information. Content Version: 54.4.580330; Current as of: November 14, 2014  Patient Education      Learning About the Diet for Swallowing Problems  What are swallowing problems? Difficulty swallowing is also called dysphagia (say \"iyg-YKM-kme-uh\"). It is most often a sign of a problem with your throat or esophagus. This is the tube that moves food and liquids from the back of your mouth to your stomach. Trouble swallowing can occur when the muscles and nerves that move food through the throat and esophagus are not working right. To help you swallow food, your doctor or speech therapist may advise a special dysphagia diet for you. Why is a special diet important? A dysphagia diet can help you handle some problems that can occur when it's hard to swallow food and liquids easily. These problems can include:  · Malnutrition. This means you aren't getting enough healthy foods to keep your body working well. · Dehydration. This means you aren't getting enough liquids to keep your body healthy. · Aspiration. This means that food, liquid, or saliva goes down your windpipe (trachea) into your lungs, instead of down your esophagus to your stomach. This can lead to aspiration pneumonia, which is an inflammation of the lungs. What is the dysphagia diet? In the dysphagia diet, you change the foods you eat and the liquids you drink to make it easier to swallow them. You may:  · Change the texture of the foods you eat. Your doctor or speech therapist may advise you to eat one of these types of foods:  ? Solid, soft foods that have been cut up into small pieces. Extra gravy or sauce can help make these foods easier to swallow.   ? Semi-solid foods, like ground meats or fruits and vegetables that are mashed with a fork. These foods require some chewing. Extra gravy or sauce is often served. ? Foods that are the texture of pudding. You don't have to chew these foods. Examples include mashed potatoes with gravy; yogurt; pudding; and pureed meats, fruits, and vegetables. · Thicken the liquids you drink. Your doctor or speech therapist will tell you what kind of thickener to use and how thick to make the liquids. ? Nectar-thick liquids leave a thin coating when they are poured from a spoon. ? Honey-thick liquids drip slowly when they are poured from a spoon. ? Pudding-thick liquids do not pour from a spoon. Your speech therapist will help you learn exercises to train your muscles to work together so you can swallow. You may also need to learn how to position your body or how to put food in your mouth to be able to swallow better. Some people have severe trouble swallowing and can't get enough food and liquids. In those cases, the doctor can insert a feeding tube so the person gets enough nutrients. Follow-up care is a key part of your treatment and safety. Be sure to make and go to all appointments, and call your doctor if you are having problems. It's also a good idea to know your test results and keep a list of the medicines you take. Where can you learn more? Go to http://harmeet-bhavani.info/. Enter H748 in the search box to learn more about \"Learning About the Diet for Swallowing Problems. \"  Current as of: September 23, 2018  Content Version: 11.9  © 0044-8016 [employer]. Care instructions adapted under license by [facility] (which disclaims liability or warranty for this information). If you have questions about a medical condition or this instruction, always ask your healthcare professional. [address] any warranty or liability for your use of this information. DISCHARGE SUMMARY from Nurse     POST-PROCEDURE INSTRUCTIONS:    Call your Physician if you:  ? Observe any excess bleeding. ? Develop a temperature over 100.5o F.  ? Experience abdominal, shoulder or chest pain. ? Notice any signs of decreased circulation or nerve impairment to an extremity such as a change in color, persistent numbness, tingling, coldness or increase in pain. ? Vomit blood or you have nausea and vomiting lasting longer than 4 hours. ? Are unable to take medications. ? Are unable to urinate within 8 hours after discharge following general anesthesia or intravenous sedation  ? Resume your diet as directed by your physician. ? Resume medications as your physician has prescribed. ? Please give a list of your current medications to your Primary Care Provider. ? Please update this list whenever your medications are discontinued, doses are changed, or new medications (including over-the-counter products) are added. ? Please carry medication information at all times in case of emergency situations. These are general instructions for a healthy lifestyle:    No smoking/ No tobacco products/ Avoid exposure to second hand smoke.  Surgeon General's Warning:  Quitting smoking now greatly reduces serious risk to your health. Obesity, smoking, and a sedentary lifestyle greatly increase your risk for illness.  A healthy diet, regular physical exercise & weight monitoring are important for maintaining a healthy lifestyle   You may be retaining fluid if you have a history of heart failure or if you experience any of the following symptoms:  Weight gain of 3 pounds or more overnight or 5 pounds in a week, increased swelling in our hands or feet or shortness of breath while lying flat in bed. Please call your doctor as soon as you notice any of these symptoms; do not wait until your next office visit.     Recognize signs and symptoms of STROKE:  F  -  Face looks uneven  A  -  Arms unable to move or move unevenly  S  -  Speech slurred or non-existent  T  -  Time to call 911 - as soon as signs and symptoms begin - DO NOT go back to bed or wait to see If you get better - TIME IS BRAIN. Colorectal Screening   Colorectal cancer almost always develops from precancerous polyps (abnormal growths) in the colon or rectum. Screening tests can find precancerous polyps, so that they can be removed before they turn into cancer. Screening tests can also find colorectal cancer early, when treatment works best.  Zuri Fish Speak with your physician about when you should begin screening and how often you should be tested     Additional Information    If you have questions, please call [phone]. Remember, [employer] is NOT to be used for urgent needs. For medical emergencies, dial 911. Educational references and/or instructions provided during this visit included:    Dysphagia      APPOINTMENTS:    Please make a follow-up appointment with your physician. Discharge information has been reviewed with the patient. The patient verbalized understanding.

## 2019-03-23 NOTE — PROCEDURES
[email or web address]  [phone]    HIGH RESOLUTION ESOPHAGEAL MANOMETRY REPORT      Indication:   1. GERD K 21.9    Date of procedure: 03/19/2019    Procedure:     After confirmation of potential allergies, a topical analgesic was used to numb the nares followed by trans-nasal insertion of a High Resolution Manometry Catheter. Pressure bands of both UES and LES were observed on the color contour. Patient instructed to take deep breath to verify placement of catheter; diaphragmatic pinch noted on inspiration. Patient was assisted to supine position and catheter was stabilized by taping at the nares. Patient wasencouraged to relax while acclimating to catheter for approximately 5 minutes. A 30 second baseline pressure was obtained to identify the UES and LES followed by a series of wet swallows using 5 ccs room temperature normal saline to assess esophageal motility and bolus transit. At the end of the study, a multiple rapid swallow sequence was performed. At the conclusion of the procedure; the catheter was removed. Findings:    1. Esophago-Gastric junction: There is complete swallow induced relaxation of the Esophago-gastric junction. The mean IRP is 14.8 (normal is less than 15). 2. Esophageal body: All swallows show normal intact contraction patterns and normal peristaltic vigour. There is no suggestion of a hypercontractile contraction (Jackhammer esophagus) or distal esophageal spasm. Mean DCI is 2377 mmHg-cm-s, lowest is >2000 mmHg-cm-s    3. Impedance: There is complete bolus clearance on all of the swallows    Impression:    1. Normal High resolution esophageal motility study. 2. Complete clearance of the bolus indicating adequate bolus transit. Reference: (Interpretation is based on Lawn classification, version 3.0). The Lawn Classification of esophageal motility disorders, v3.0. Rodolfo Barber et.al. International High Resolution Manometry Working Group. Neurogastroenterol Motil. 2015;27(2):160-09.

## 2019-03-25 ENCOUNTER — ANESTHESIA EVENT (OUTPATIENT)
Dept: ENDOSCOPY | Age: 59
End: 2019-03-25
Payer: COMMERCIAL

## 2019-03-26 ENCOUNTER — HOSPITAL ENCOUNTER (OUTPATIENT)
Age: 59
Setting detail: OUTPATIENT SURGERY
Discharge: HOME | End: 2019-03-26
Attending: INTERNAL MEDICINE | Admitting: INTERNAL MEDICINE
Payer: COMMERCIAL

## 2019-03-26 ENCOUNTER — ANESTHESIA (OUTPATIENT)
Dept: ENDOSCOPY | Age: 59
End: 2019-03-26
Payer: COMMERCIAL

## 2019-03-26 VITALS
RESPIRATION RATE: 21 BRPM | DIASTOLIC BLOOD PRESSURE: 90 MMHG | WEIGHT: 203 LBS | TEMPERATURE: 97.8 F | BODY MASS INDEX: 29.06 KG/M2 | OXYGEN SATURATION: 95 % | HEIGHT: 70 IN | HEART RATE: 86 BPM | SYSTOLIC BLOOD PRESSURE: 131 MMHG

## 2019-03-26 PROCEDURE — 88305 TISSUE EXAM BY PATHOLOGIST: CPT

## 2019-03-26 PROCEDURE — 74011250636 HC RX REV CODE- 250/636

## 2019-03-26 PROCEDURE — 76040000019: Performed by: INTERNAL MEDICINE

## 2019-03-26 PROCEDURE — 76060000031 HC ANESTHESIA FIRST 0.5 HR: Performed by: INTERNAL MEDICINE

## 2019-03-26 PROCEDURE — 77030034675 HC CAP BRAVO PH W DEL SYS GVIM -C: Performed by: INTERNAL MEDICINE

## 2019-03-26 PROCEDURE — 77030009426 HC FCPS BIOP ENDOSC BSC -B: Performed by: INTERNAL MEDICINE

## 2019-03-26 PROCEDURE — 74011250636 HC RX REV CODE- 250/636: Performed by: NURSE ANESTHETIST, CERTIFIED REGISTERED

## 2019-03-26 RX ORDER — SODIUM CHLORIDE, SODIUM LACTATE, POTASSIUM CHLORIDE, CALCIUM CHLORIDE 600; 310; 30; 20 MG/100ML; MG/100ML; MG/100ML; MG/100ML
25 INJECTION, SOLUTION INTRAVENOUS CONTINUOUS
Status: DISCONTINUED | OUTPATIENT
Start: 2019-03-26 | End: 2019-03-26 | Stop reason: HOSPADM

## 2019-03-26 RX ORDER — SODIUM CHLORIDE, SODIUM LACTATE, POTASSIUM CHLORIDE, CALCIUM CHLORIDE 600; 310; 30; 20 MG/100ML; MG/100ML; MG/100ML; MG/100ML
INJECTION, SOLUTION INTRAVENOUS
Status: DISCONTINUED | OUTPATIENT
Start: 2019-03-26 | End: 2019-03-26 | Stop reason: HOSPADM

## 2019-03-26 RX ORDER — ESOMEPRAZOLE MAGNESIUM 40 MG/1
40 CAPSULE, DELAYED RELEASE ORAL 2 TIMES DAILY
COMMUNITY
End: 2020-04-08

## 2019-03-26 RX ORDER — SODIUM CHLORIDE, SODIUM LACTATE, POTASSIUM CHLORIDE, CALCIUM CHLORIDE 600; 310; 30; 20 MG/100ML; MG/100ML; MG/100ML; MG/100ML
100 INJECTION, SOLUTION INTRAVENOUS CONTINUOUS
Status: CANCELLED | OUTPATIENT
Start: 2019-03-26

## 2019-03-26 RX ORDER — EPINEPHRINE 0.1 MG/ML
1 INJECTION INTRACARDIAC; INTRAVENOUS
Status: DISCONTINUED | OUTPATIENT
Start: 2019-03-26 | End: 2019-03-26 | Stop reason: HOSPADM

## 2019-03-26 RX ORDER — SODIUM CHLORIDE 0.9 % (FLUSH) 0.9 %
5-40 SYRINGE (ML) INJECTION EVERY 8 HOURS
Status: DISCONTINUED | OUTPATIENT
Start: 2019-03-26 | End: 2019-03-26 | Stop reason: HOSPADM

## 2019-03-26 RX ORDER — SODIUM CHLORIDE 0.9 % (FLUSH) 0.9 %
5-40 SYRINGE (ML) INJECTION AS NEEDED
Status: DISCONTINUED | OUTPATIENT
Start: 2019-03-26 | End: 2019-03-26 | Stop reason: HOSPADM

## 2019-03-26 RX ORDER — LIDOCAINE HYDROCHLORIDE 10 MG/ML
0.1 INJECTION, SOLUTION EPIDURAL; INFILTRATION; INTRACAUDAL; PERINEURAL AS NEEDED
Status: DISCONTINUED | OUTPATIENT
Start: 2019-03-26 | End: 2019-03-26 | Stop reason: HOSPADM

## 2019-03-26 RX ORDER — FLUMAZENIL 0.1 MG/ML
0.2 INJECTION INTRAVENOUS
Status: DISCONTINUED | OUTPATIENT
Start: 2019-03-26 | End: 2019-03-26 | Stop reason: HOSPADM

## 2019-03-26 RX ORDER — FAMOTIDINE 20 MG/1
20 TABLET, FILM COATED ORAL ONCE
Status: DISCONTINUED | OUTPATIENT
Start: 2019-03-26 | End: 2019-03-26 | Stop reason: HOSPADM

## 2019-03-26 RX ORDER — ATROPINE SULFATE 0.1 MG/ML
0.5 INJECTION INTRAVENOUS
Status: DISCONTINUED | OUTPATIENT
Start: 2019-03-26 | End: 2019-03-26 | Stop reason: HOSPADM

## 2019-03-26 RX ORDER — PROMETHAZINE HYDROCHLORIDE 25 MG/1
25 TABLET ORAL
COMMUNITY
End: 2020-04-08

## 2019-03-26 RX ORDER — DEXTROMETHORPHAN/PSEUDOEPHED 2.5-7.5/.8
1.2 DROPS ORAL
Status: DISCONTINUED | OUTPATIENT
Start: 2019-03-26 | End: 2019-03-26 | Stop reason: HOSPADM

## 2019-03-26 RX ORDER — NALOXONE HYDROCHLORIDE 0.4 MG/ML
0.4 INJECTION, SOLUTION INTRAMUSCULAR; INTRAVENOUS; SUBCUTANEOUS
Status: DISCONTINUED | OUTPATIENT
Start: 2019-03-26 | End: 2019-03-26 | Stop reason: HOSPADM

## 2019-03-26 RX ORDER — PROPOFOL 10 MG/ML
INJECTION, EMULSION INTRAVENOUS AS NEEDED
Status: DISCONTINUED | OUTPATIENT
Start: 2019-03-26 | End: 2019-03-26 | Stop reason: HOSPADM

## 2019-03-26 RX ADMIN — SODIUM CHLORIDE, SODIUM LACTATE, POTASSIUM CHLORIDE, CALCIUM CHLORIDE: 600; 310; 30; 20 INJECTION, SOLUTION INTRAVENOUS at 11:11

## 2019-03-26 RX ADMIN — PROPOFOL 100 MG: 10 INJECTION, EMULSION INTRAVENOUS at 11:18

## 2019-03-26 RX ADMIN — PROPOFOL 50 MG: 10 INJECTION, EMULSION INTRAVENOUS at 11:22

## 2019-03-26 RX ADMIN — PROPOFOL 50 MG: 10 INJECTION, EMULSION INTRAVENOUS at 11:20

## 2019-03-26 RX ADMIN — SODIUM CHLORIDE, SODIUM LACTATE, POTASSIUM CHLORIDE, AND CALCIUM CHLORIDE 25 ML/HR: 600; 310; 30; 20 INJECTION, SOLUTION INTRAVENOUS at 10:46

## 2019-03-26 NOTE — OP NOTES
Patient complaint of severe migrane  headache. Dr. Marga Cervantes made aware and he spoke with patient and pain medication prescription given to patient.

## 2019-03-26 NOTE — PERIOP NOTES
Pre and post-procedure BRAVO instructions discussed with patient and patient's spouse at bedside. Verbal understanding with return demonstration of pushing symptom and activity buttons on BRAVO device. Patient to remain off PPI first two days of study and on last two days of study.  Patient verbalized understanding.

## 2019-03-26 NOTE — DISCHARGE INSTRUCTIONS
Patient Education        Esophagitis: Care Instructions  Your Care Instructions    Esophagitis (say \"ih-sof-uh-JY-tus\") is irritation of the esophagus, the tube that carries food from your throat to your stomach. Acid reflux is the most common cause of this condition. When you have reflux, stomach acid and juices flow upward. This can cause pain or a burning feeling in your chest. You may have a sore throat. It may be hard to swallow. Other causes of this condition include some medicines and supplements. Allergies or an infection can also cause it. Your doctor will ask about your symptoms and past health. He or she might do tests to find the cause of your symptoms. Treatment depends on what is causing the problem. Treatment might include changing your diet or taking medicine to relieve your symptoms. It might also include changing a medicine that is causing your symptoms. If you have reflux, medicine that reduces the stomach acid helps your body heal. It might take 1 to 3 weeks to heal.  Follow-up care is a key part of your treatment and safety. Be sure to make and go to all appointments, and call your doctor if you are having problems. It's also a good idea to know your test results and keep a list of the medicines you take. How can you care for yourself at home? · If you have acid reflux, your doctor may recommend that you:  ? Eat several small meals instead of two or three large meals. After you eat, wait 2 to 3 hours before you lie down. ? Avoid chocolate, mint, alcohol, and spicy foods. ? Don't smoke or use smokeless tobacco. Smoking can make this condition worse. If you need help quitting, talk to your doctor about stop-smoking programs and medicines. These can increase your chances of quitting for good. ? Raise the head of your bed 6 to 8 inches if you have symptoms at night. ? Lose weight if you are overweight. ? Take an over-the-counter antacid, such as Maalox, Mylanta, or Tums.  Be careful when you take over-the-counter antacid medicines. Many of these medicines have aspirin in them. Read the label to make sure that you are not taking more than the recommended dose. Too much aspirin can be harmful. ? Take stronger acid reducers. Examples are famotidine (such as Pepcid), omeprazole (such as Prilosec), and ranitidine (such as Zantac). · If your condition is caused by infection, allergy, or other problems, use the medicine or treatments that your doctor recommends. · Be safe with medicines. Take your medicines exactly as prescribed. Call your doctor if you think you are having a problem with your medicine. When should you call for help? Call your doctor now or seek immediate medical care if:    · You have new or worse belly pain.     · You are vomiting.    Watch closely for changes in your health, and be sure to contact your doctor if:    · You have new or worse symptoms of reflux.     · You have trouble or pain swallowing.     · You are losing weight.     · You do not get better as expected. Where can you learn more? Go to http://harmeet-bhavani.info/. Enter J107 in the search box to learn more about \"Esophagitis: Care Instructions. \"  Current as of: March 27, 2018  Content Version: 11.9  © 2529-3494 Healthwise, Incorporated. Care instructions adapted under license by [employer] (which disclaims liability or warranty for this information). If you have questions about a medical condition or this instruction, always ask your healthcare professional. [place] [address] any warranty or liability for your use of this information. Upper GI Endoscopy: What to Expect at [address] [place] [address]  After you have an endoscopy, you will stay at the hospital or clinic for 1 to 2 hours. This will allow the medicine to wear off. You will be able to go home after your doctor or nurse checks to make sure you are not having any problems.   You may have to stay overnight if you had treatment during the test. You may have a sore throat for a day or two after the test.  This care sheet gives you a general idea about what to expect after the test.  How can you care for yourself at home? Activity  · Rest as much as you need to after you go home. · You should be able to go back to your usual activities the day after the test.  Diet  · Follow your doctor's directions for eating after the test.  · Drink plenty of fluids (unless your doctor has told you not to). Medications  · If you have a sore throat the day after the test, use an over-the-counter spray to numb your throat. Follow-up care is a key part of your treatment and safety. Be sure to make and go to all appointments, and call your doctor if you are having problems. It's also a good idea to know your test results and keep a list of the medicines you take. When should you call for help? Call 911 anytime you think you may need emergency care. For example, call if:  · You passed out (lost consciousness). · You cough up blood. · You vomit blood or what looks like coffee grounds. · You pass maroon or very bloody stools. Call your doctor now or seek immediate medical care if:  · You have trouble swallowing. · You have belly pain. · Your stools are black and tarlike or have streaks of blood. · You are sick to your stomach or cannot keep fluids down. Watch closely for changes in your health, and be sure to contact your doctor if:  · Your throat still hurts after a day or two. · You do not get better as expected. Where can you learn more? Go to DealAircomer.be  Enter J454 in the search box to learn more about \"Upper GI Endoscopy: What to Expect at Home. \"   © 7224-0325 Healthwise, Incorporated. Care instructions adapted under license by [facility] [employer] (which disclaims liability or warranty for this information).  This care instruction is for use with your licensed healthcare professional. If you have questions about a medical condition or this instruction, always ask your healthcare professional. [address] any warranty or liability for your use of this information. Content Version: 56.3.943873; Current as of: November 14, 2014    DISCHARGE SUMMARY from Nurse     POST-PROCEDURE INSTRUCTIONS:    Call your Physician if you:  ? Observe any excess bleeding. ? Develop a temperature over 100.5o F.  ? Experience abdominal, shoulder or chest pain. ? Notice any signs of decreased circulation or nerve impairment to an extremity such as a change in color, persistent numbness, tingling, coldness or increase in pain. ? Vomit blood or you have nausea and vomiting lasting longer than 4 hours. ? Are unable to take medications. ? Are unable to urinate within 8 hours after discharge following general anesthesia or intravenous sedation. For the next 24 hours after receiving general anesthesia or intravenous sedation, or while taking prescription Narcotics, limit your activities:  ? Do NOT drive a motor vehicle, operate hazard machinery or power tools, or perform tasks that require coordination. The medication you received during your procedure may have some effect on your mental awareness. ? Do NOT make important personal or business decisions. The medication you received during your procedure may have some effect on your mental awareness. ? Do NOT drink alcoholic beverages. These drinks do not mix well with the medications that have been given to you. ? Upon discharge from the hospital, you must be accompanied by a responsible adult. ? Resume your diet as directed by your physician. ? Resume medications as your physician has prescribed. ? Please give a list of your current medications to your Primary Care Provider. ? Please update this list whenever your medications are discontinued, doses are changed, or new medications (including over-the-counter products) are added. ?  Please carry medication information at all times in case of emergency situations. These are general instructions for a healthy lifestyle:    No smoking/ No tobacco products/ Avoid exposure to second hand smoke.  Surgeon General's Warning:  Quitting smoking now greatly reduces serious risk to your health. Obesity, smoking, and a sedentary lifestyle greatly increase your risk for illness.  A healthy diet, regular physical exercise & weight monitoring are important for maintaining a healthy lifestyle   You may be retaining fluid if you have a history of heart failure or if you experience any of the following symptoms:  Weight gain of 3 pounds or more overnight or 5 pounds in a week, increased swelling in our hands or feet or shortness of breath while lying flat in bed. Please call your doctor as soon as you notice any of these symptoms; do not wait until your next office visit. Recognize signs and symptoms of STROKE:  F  -  Face looks uneven  A  -  Arms unable to move or move unevenly  S  -  Speech slurred or non-existent  T  -  Time to call 911 - as soon as signs and symptoms begin - DO NOT go back to bed or wait to see If you get better - TIME IS BRAIN. Colorectal Screening   Colorectal cancer almost always develops from precancerous polyps (abnormal growths) in the colon or rectum. Screening tests can find precancerous polyps, so that they can be removed before they turn into cancer. Screening tests can also find colorectal cancer early, when treatment works best.  Carlos Dozier Speak with your physician about when you should begin screening and how often you should be tested     Additional Information    If you have questions, please call [phone]. Remember, QA on [employer] is NOT to be used for urgent needs. For medical emergencies, dial 911.     Educational references and/or instructions provided during this visit included:    see attachedments      APPOINTMENTS:    Please make a follow-up appointment with your physician. Discharge information has been reviewed with the patient and responsible party. The patient and responsible party verbalized understanding. Patient Education   Patient Education        Esophagitis: Care Instructions  Your Care Instructions    Esophagitis (say \"ih-sof-uh-JY-tus\") is irritation of the esophagus, the tube that carries food from your throat to your stomach. Acid reflux is the most common cause of this condition. When you have reflux, stomach acid and juices flow upward. This can cause pain or a burning feeling in your chest. You may have a sore throat. It may be hard to swallow. Other causes of this condition include some medicines and supplements. Allergies or an infection can also cause it. Your doctor will ask about your symptoms and past health. He or she might do tests to find the cause of your symptoms. Treatment depends on what is causing the problem. Treatment might include changing your diet or taking medicine to relieve your symptoms. It might also include changing a medicine that is causing your symptoms. If you have reflux, medicine that reduces the stomach acid helps your body heal. It might take 1 to 3 weeks to heal.  Follow-up care is a key part of your treatment and safety. Be sure to make and go to all appointments, and call your doctor if you are having problems. It's also a good idea to know your test results and keep a list of the medicines you take. How can you care for yourself at home? · If you have acid reflux, your doctor may recommend that you:  ? Eat several small meals instead of two or three large meals. After you eat, wait 2 to 3 hours before you lie down. ? Avoid chocolate, mint, alcohol, and spicy foods. ? Don't smoke or use smokeless tobacco. Smoking can make this condition worse. If you need help quitting, talk to your doctor about stop-smoking programs and medicines. These can increase your chances of quitting for good.   ? Raise the head of your bed 6 to 8 inches if you have symptoms at night. ? Lose weight if you are overweight. ? Take an over-the-counter antacid, such as Maalox, Mylanta, or Tums. Be careful when you take over-the-counter antacid medicines. Many of these medicines have aspirin in them. Read the label to make sure that you are not taking more than the recommended dose. Too much aspirin can be harmful. ? Take stronger acid reducers. Examples are famotidine (such as Pepcid), omeprazole (such as Prilosec), and ranitidine (such as Zantac). · If your condition is caused by infection, allergy, or other problems, use the medicine or treatments that your doctor recommends. · Be safe with medicines. Take your medicines exactly as prescribed. Call your doctor if you think you are having a problem with your medicine. When should you call for help? Call your doctor now or seek immediate medical care if:    · You have new or worse belly pain.     · You are vomiting.    Watch closely for changes in your health, and be sure to contact your doctor if:    · You have new or worse symptoms of reflux.     · You have trouble or pain swallowing.     · You are losing weight.     · You do not get better as expected. Where can you learn more? Go to http://harmeet-bhavani.info/. Enter Q761 in the search box to learn more about \"Esophagitis: Care Instructions. \"  Current as of: March 27, 2018  Content Version: 11.9  © 3373-2196 [employer]. Care instructions adapted under license by [employer] (which disclaims liability or warranty for this information). If you have questions about a medical condition or this instruction, always ask your healthcare professional. [place] [address] any warranty or liability for your use of this information. Hiatal Hernia: Care Instructions  Your Care Instructions  A hiatal hernia occurs when part of the stomach bulges into the chest cavity.   A hiatal hernia may allow stomach acid and juices to back up into the esophagus (acid reflux). This can cause a feeling of burning, warmth, heat, or pain behind the breastbone. This feeling may often occur after you eat, soon after you lie down, or when you bend forward, and it may come and go. You also may have a sour taste in your mouth. These symptoms are commonly known as heartburn or reflux. But not all hiatal hernias cause symptoms. Follow-up care is a key part of your treatment and safety. Be sure to make and go to all appointments, and call your doctor if you are having problems. It's also a good idea to know your test results and keep a list of the medicines you take. How can you care for yourself at home? · Take your medicines exactly as prescribed. Call your doctor if you think you are having a problem with your medicine. · Do not take aspirin or other nonsteroidal anti-inflammatory drugs (NSAIDs), such as ibuprofen (Advil, Motrin) or naproxen (Aleve), unless your doctor says it is okay. Ask your doctor what you can take for pain. · Your doctor may recommend over-the-counter medicine. For mild or occasional indigestion, antacids such as Tums, Gaviscon, Maalox, or Mylanta may help. Your doctor also may recommend over-the-counter acid reducers, such as famotidine (Pepcid AC), cimetidine (Tagamet HB), ranitidine (Zantac 75 and Zantac 150), or omeprazole (Prilosec). Read and follow all instructions on the label. If you use these medicines often, talk with your doctor. · Change your eating habits. ? It's best to eat several small meals instead of two or three large meals. ? After you eat, wait 2 to 3 hours before you lie down. Late-night snacks aren't a good idea. ? Chocolate, mint, and alcohol can make heartburn worse. They relax the valve between the esophagus and the stomach. ?  Spicy foods, foods that have a lot of acid (like tomatoes and oranges), and coffee can make heartburn symptoms worse in some people. If your symptoms are worse after you eat a certain food, you may want to stop eating that food to see if your symptoms get better. · Do not smoke or chew tobacco.  · If you get heartburn at night, raise the head of your bed 6 to 8 inches by putting the frame on blocks or placing a foam wedge under the head of your mattress. (Adding extra pillows does not work.)  · Do not wear tight clothing around your middle. · Lose weight if you need to. Losing just 5 to 10 pounds can help. When should you call for help? Call your doctor now or seek immediate medical care if:    · You have new or worse belly pain.     · You are vomiting.    Watch closely for changes in your health, and be sure to contact your doctor if:    · You have new or worse symptoms of indigestion.     · You have trouble or pain swallowing.     · You are losing weight.     · You do not get better as expected. Where can you learn more? Go to http://harmeet-bhavani.info/. Enter O758 in the search box to learn more about \"Hiatal Hernia: Care Instructions. \"  Current as of: March 27, 2018  Content Version: 11.9  © 5111-2855 [employer]. Care instructions adapted under license by [employer] (which disclaims liability or warranty for this information). If you have questions about a medical condition or this instruction, always ask your healthcare professional. [place] [address] any warranty or liability for your use of this information.

## 2019-03-26 NOTE — ANESTHESIA POSTPROCEDURE EVALUATION
Procedure(s): UPPER ENDOSCOPY / Brain Linea PH / BXS / Dilation. MAC Anesthesia Post Evaluation Multimodal analgesia: multimodal analgesia used between 6 hours prior to anesthesia start to PACU discharge Patient location during evaluation: bedside Patient participation: complete - patient participated Level of consciousness: awake Pain management: adequate Airway patency: patent Anesthetic complications: no 
Cardiovascular status: stable Respiratory status: acceptable Hydration status: acceptable Post anesthesia nausea and vomiting:  controlled Vitals Value Taken Time /110 3/26/2019 11:32 AM  
Temp Pulse 88 3/26/2019 11:35 AM  
Resp 15 3/26/2019 11:35 AM  
SpO2 96 % 3/26/2019 11:35 AM  
Vitals shown include unvalidated device data.

## 2019-03-26 NOTE — ANESTHESIA PREPROCEDURE EVALUATION
Relevant Problems No relevant active problems Anesthetic History No history of anesthetic complications Review of Systems / Medical History Patient summary reviewed, nursing notes reviewed and pertinent labs reviewed Pulmonary Within defined limits Neuro/Psych Within defined limits Cardiovascular Within defined limits Exercise tolerance: >4 METS 
  
GI/Hepatic/Renal 
  
GERD: poorly controlled Endo/Other Arthritis Other Findings Physical Exam 
 
Airway Mallampati: III 
TM Distance: 4 - 6 cm Neck ROM: decreased range of motion Mouth opening: Normal 
 
 Cardiovascular Rhythm: regular Rate: normal 
 
 
 
 Dental 
No notable dental hx Pulmonary Breath sounds clear to auscultation Abdominal 
GI exam deferred Other Findings Anesthetic Plan ASA: 2 Anesthesia type: MAC Anesthetic plan and risks discussed with: Patient

## 2019-03-26 NOTE — H&P
[facility]   [email or web address]      Impression: 1. Refractory reflux. 2. Dysphagia      Plan:     1. EGD with Bravo probe with MAC      Chief Complaint: Refractory reflux      HPI:  Joshua Ayon is a 63 y.o. male who is being seen preop for 7 day El test off medication. He has cont reflux despite maximal antisecretory tx. Preparing for possible Toupet procedure. Frutoso Golder     PMH:   Past Medical History:   Diagnosis Date    Arthritis     Hiatal hernia     Labral tear of hip, degenerative 12/11/2016    Male urinary stress incontinence     Malignant [facility]oplasm of prostate ([facility] [address]     Migraines     Other male erectile dysfunction     CADEN (stress urinary incontinence), male     Thromboembolus ([address]     left leg, blood clot left groin       PSH:   Past Surgical History:   Procedure Laterality Date    HX BACK SURGERY      twice two cages and screws    HX CARPAL TUNNEL RELEASE      HX CERVICAL FUSION      5-7    HX KNEE ARTHROSCOPY      torn meniscus left    HX KNEE ARTHROSCOPY      torn ligament left    HX KNEE ARTHROSCOPY      right meniscus twice    HX LUMBAR FUSION      L3-5 and S1-S2    HX ORTHOPAEDIC      bilateral elbow muscle removed from joint    HX ORTHOPAEDIC      ctr left    HX ORTHOPAEDIC      left singleton neuroma    HX ORTHOPAEDIC      plantar fasciatis left    HX ORTHOPAEDIC      right cyst removed right foot    HX RADICAL PROSTATECTOMY  03/09/2018    RALP, PLND    HX ROTATOR CUFF REPAIR      right with torn labrum and bicep    HX UROLOGICAL  02/26/2019    [facility], Cystoscopy, advance xp male sling placement, Dr Raya Gutierrez      left     TOTAL KNEE ARTHROPLASTY      partial right       Social HX:   Social History     Socioeconomic History    Marital status:      Spouse name: Not on file    Number of children: Not on file    Years of education: Not on file    Highest education level: Not on file Occupational History    Not on file   Social Needs    Financial resource strain: Not on file    Food insecurity:     Worry: Not on file     Inability: Not on file    Transportation needs:     Medical: Not on file     Non-medical: Not on file   Tobacco Use    Smoking status: Former Smoker     Packs/day: 0.50     Years: 25.00     Pack years: 12.50     Last attempt to quit:      Years since quittin.2    Smokeless tobacco: Never Used   Substance and Sexual Activity    Alcohol use: No    Drug use: No    Sexual activity: Never     Partners: Female   Lifestyle    Physical activity:     Days per week: Not on file     Minutes per session: Not on file    Stress: Not on file   Relationships    Social connections:     Talks on phone: Not on file     Gets together: Not on file     Attends Orthodox service: Not on file     Active member of club or organization: Not on file     Attends meetings of clubs or organizations: Not on file     Relationship status: Not on file    Intimate partner violence:     Fear of current or ex partner: Not on file     Emotionally abused: Not on file     Physically abused: Not on file     Forced sexual activity: Not on file   Other Topics Concern    Not on file   Social History Narrative    Not on file       FHX:   Family History   Problem Relation Age of Onset    Other Mother         aneurysm with associated brain tumor    Dementia Mother     No Known Problems Father        Allergy:   Allergies   Allergen Reactions    Codeine Nausea and Vomiting    Tylenol [Acetaminophen] Other (comments)     2/t liver function         Home Medications:     Medications Prior to Admission   Medication Sig    esomeprazole (NEXIUM) 40 mg capsule Take 40 mg by mouth two (2) times a day.  promethazine (PHENERGAN) 25 mg tablet Take 25 mg by mouth every six (6) hours as needed for Nausea.  traMADol (ULTRAM) 50 mg tablet Take 50 mg by mouth every six (6) hours as needed for Pain.     traMADol (ULTRAM-ER) 100 mg tablet Take 100 mg by mouth daily.  TADALAFIL PO Take 6 mg by mouth daily.  aspirin delayed-release 81 mg tablet Take 81 mg by mouth daily.  tamsulosin (FLOMAX) 0.4 mg capsule Take 1 Cap by mouth daily (after dinner).  sildenafil citrate (VIAGRA) 100 mg tablet Take one tab daily as needed.  raNITIdine (ZANTAC) 300 mg tab Take 300 mg by mouth daily.  multivitamin, tx-iron-ca-min (THERA-M W/ IRON) 9 mg iron-400 mcg tab tablet Take 1 Tab by mouth daily.  omeprazole (PRILOSEC) 40 mg capsule TAKE 1 CAPSULE BY MOUTH TWO TIMES A DAY 30 MINS BEFORE BREAKFAST AND DINNER       Review of Systems:     Constitutional: No fevers, chills, weight loss, fatigue. Cardiovascular: No chest pain, heart palpitations. Respiratory: No cough, SOB, wheezing, chest discomfort, orthopnea. Gastrointestinal: Reflux despite maximal antisecretory tx. Musculoskeletal: No weakness, arthralgias, wasting. Allergies: As noted. Visit Vitals  Ht 5' 10\" (1.778 m)   Wt 92.1 kg (203 lb)   BMI 29.13 kg/m²       Physical Assessment:     constitutional: appearance: well developed, well nourished, normal habitus, no deformities, in no acute distress. ENMT: mouth: normal oral mucosa,lips and gums; good dentition. oropharynx: normal tongue, hard and soft palate; posterior pharynx without erithema, exudate or lesions. respiratory: effort: normal chest excursion; no intercostal retraction or accessory muscle use. cardiovascular: abdominal aorta: normal size and position; no bruits. palpation: PMI of normal size and position; normal rhythm; no thrill or murmurs. abdominal: abdomen: normal consistency; no tenderness or masses. hernias: no hernias appreciated. liver: normal size and consistency. spleen: not palpable. rectal: hemoccult/guaiac: not performed.    musculoskeletal: digits and nails: no clubbing, cyanosis, petechiae or other inflammatory conditions. psychiatric: orientation: oriented to time, space and person. Siri Amador MD, M.D. [facility], [address] [place] [address]  Pager [phone]  [email or web address]

## 2019-03-26 NOTE — PROCEDURES
[facility]  [address] [facility], [address]      Brief Procedure Note    Kiesha Hunter  1960  [id number]    Date of Procedure: 3/26/2019    Preoperative diagnosis: 530.10 - K20.9,  Esophagitis, unspecified  787.24 - R13.14,  Dysphagia, pharyngoesophageal phase    Postoperative diagnosis:  Grade 3 esophagitis. Stricture. BRAVO Placement at 34cm. Duodenitis. Hiatal Hernia.  Dilation 50 FR Vasquez    Type of Anesthesia: MAC (monitered anesthesia care)    Description of Findings: same as post op dx    Procedure: Procedure(s):  UPPER ENDOSCOPY / Cosimo Dayron PH / BXS / Dilation    :  Dr. Fredrick Slade MD    Assistant(s): [unfilled]    Type of Anesthesia:MAC     EBL:None    Specimens:   ID Type Source Tests Collected by Time Destination   1 : Duodenum Bulb BXS Preservative Duodenum  Tana Bernard MD 3/26/2019 1121 Pathology       Findings: See printed and scanned procedure note    Complications: None    Dr. Fredrick Slade MD  3/26/2019  11:28 AM

## 2021-08-03 PROBLEM — C61 PROSTATE CANCER (HCC): Status: RESOLVED | Noted: 2018-01-17 | Resolved: 2021-08-03

## (undated) DEVICE — SYR IRR CATH TIP LR ADPT 70ML -- CONVERT TO ITEM 363120

## (undated) DEVICE — SUTURE V-LOC 180 SZ 3-0 L6IN ABSRB VLT CV-23 L17MM 1/2 CIR VLOCM0804

## (undated) DEVICE — SYR 10ML CTRL LR LCK NSAF LF --

## (undated) DEVICE — SUTURE VCRL SZ 4-0 L27IN ABSRB UD L17MM RB-1 1/2 CIR J214H

## (undated) DEVICE — CATHETER URETH 20FR 5CC BLLN SIL ELASTMR F 2 W

## (undated) DEVICE — BITE BLOCK ENDOSCP UNIV AD 6 TO 9.4 MM

## (undated) DEVICE — REM POLYHESIVE ADULT PATIENT RETURN ELECTRODE: Brand: VALLEYLAB

## (undated) DEVICE — BLADE ASSEMB CLP HAIR FINE --

## (undated) DEVICE — APPLIER CLP M/L SHFT DIA5MM 15 LIG LIGAMAX 5

## (undated) DEVICE — SHEET,DRAPE,70X100,STERILE: Brand: MEDLINE

## (undated) DEVICE — SUTURE MCRYL SZ 4-0 L18IN ABSRB UD L19MM PS-2 3/8 CIR PRIM Y496G

## (undated) DEVICE — TOWEL SURG W16XL26IN BLU NONFENESTRATED DLX ST 2 PER PK

## (undated) DEVICE — NEEDLE HYPO 25GA L1.5IN BLU POLYPR HUB S STL REG BVL STR

## (undated) DEVICE — Z DISCONTINUED USE 2639304 STRAP POS W3INXL30FT WIDE BK TO BK HK AND LOOP CLSR ALISTRP

## (undated) DEVICE — HOOK RETRCT DIA5MM SHRP E STAY DISP FOR LONE STAR SELF RET

## (undated) DEVICE — SUTURE VCRL SZ 0 L27IN ABSRB UD L36MM CT-1 1/2 CIR J260H

## (undated) DEVICE — BLADELESS OPTICAL TROCAR WITH FIXATION CANNULA: Brand: VERSAPORT

## (undated) DEVICE — SYR 50ML LR LCK 1ML GRAD NSAF --

## (undated) DEVICE — LIGHT HANDLE: Brand: DEVON

## (undated) DEVICE — SUTURE PDS II SZ 0 L27IN ABSRB VLT L36MM CT-1 1/2 CIR Z340H

## (undated) DEVICE — CATH NEPHSTMY 4 WNG MCOT 24FR -- LTX

## (undated) DEVICE — Device

## (undated) DEVICE — CATH IV SAFE STR 22GX1IN BLU -- PROTECTIV PLUS

## (undated) DEVICE — SUTURE VCRL SZ 0 L18IN ABSRB UD POLYGLACTIN 910 BRAID TIE J912G

## (undated) DEVICE — DISPOSABLE SUCTION/IRRIGATOR TUBE SET WITH TIP: Brand: AHTO

## (undated) DEVICE — 1010 S-DRAPE TOWEL DRAPE 10/BX: Brand: STERI-DRAPE™

## (undated) DEVICE — DEPAUL MINOR LITHOTOMY CDS: Brand: MEDLINE INDUSTRIES, INC.

## (undated) DEVICE — STERILE POLYISOPRENE POWDER-FREE SURGICAL GLOVES: Brand: PROTEXIS

## (undated) DEVICE — GAUZE SPONGES,16 PLY: Brand: CURITY

## (undated) DEVICE — SOLUTION IRRIG 3000ML H2O STRL BAG

## (undated) DEVICE — DEVICE SECUREMENT AD W/ TRICOT ANCHR PD FOR F LTX SIL CATH

## (undated) DEVICE — HOOK RETRCT 12MM E 2 FNGR FOR LONE STAR RETRCT SYS

## (undated) DEVICE — VISUALIZATION SYSTEM: Brand: CLEARIFY

## (undated) DEVICE — RING RETRCTR FIG 8 32.5X18.3CM -- PLAS STRL W/2 CATH CLIPS

## (undated) DEVICE — MEDI-VAC NON-CONDUCTIVE SUCTION TUBING 6MM X 6.1M (20 FT.) L: Brand: CARDINAL HEALTH

## (undated) DEVICE — 1016 S-DRAPE IRRIG POUCH 10/BOX: Brand: STERI-DRAPE™

## (undated) DEVICE — STERILE LATEX POWDER-FREE SURGICAL GLOVESWITH NITRILE COATING: Brand: PROTEXIS

## (undated) DEVICE — CLIP LIG ABSRB HEM-LOK LG PUR --

## (undated) DEVICE — Y-TYPE TUR/BLADDER IRRIGATION SET, REGULATING CLAMP

## (undated) DEVICE — KENDALL SCD EXPRESS SLEEVES, KNEE LENGTH, MEDIUM: Brand: KENDALL SCD

## (undated) DEVICE — SUTURE DEV SZ 3-0 V-LOC 90 L12IN TO L18IN CV-23 VLT VLOCM0844

## (undated) DEVICE — SUTURE V-LOC 90 3-0 L6IN ABSRB UD CV-23 L17MM 1/2 CIR VLOCM1904

## (undated) DEVICE — INTENDED FOR TISSUE SEPARATION, AND OTHER PROCEDURES THAT REQUIRE A SHARP SURGICAL BLADE TO PUNCTURE OR CUT.: Brand: BARD-PARKER SAFETY BLADES SIZE 15, STERILE

## (undated) DEVICE — SOLUTION IV STRL H2O 500 ML AQUALITE POUR BTL

## (undated) DEVICE — Z DISCONTINUED NO SUB IDED CAPSULE PH GASTROENTEROLOGY ES MON FOR REFLX DEL SYS BRAVO

## (undated) DEVICE — SOLUTION IV 1000ML 0.9% SOD CHL

## (undated) DEVICE — TIP COVER ACCESSORY

## (undated) DEVICE — GAUZE,SPONGE,4"X4",16PLY,STRL,LF,10/TRAY: Brand: MEDLINE

## (undated) DEVICE — LAPAROSCOPIC SCISSORS: Brand: EPIX LAPAROSCOPIC SCISSORS

## (undated) DEVICE — STAPLER SKIN H3.9MM WIRE DIA0.58MM CRWN 6.9MM 35 STPL FIX

## (undated) DEVICE — SUTURE PDS II SZ 3-0 L27IN ABSRB VLT RB-1 L17MM 1/2 CIR Z305H

## (undated) DEVICE — INSTRMT SET WND CLSR SUT PASS --

## (undated) DEVICE — SYRINGE MED 3ML NDL 22GA L1IN PLAS N CTRL LUERLOCK TIP REG

## (undated) DEVICE — (D)PREP SKN CHLRAPRP APPL 26ML -- CONVERT TO ITEM 371833

## (undated) DEVICE — DRAPE,UTILITY,TAPE,15X26,STERILE: Brand: MEDLINE

## (undated) DEVICE — DERMABOND SKIN ADH 0.7ML -- DERMABOND ADVANCED 12/BX

## (undated) DEVICE — APPLICATOR SEAL FLOSEAL 5MM+ --

## (undated) DEVICE — PAD,NON-ADHERENT,3X8,STERILE,LF,1/PK: Brand: MEDLINE

## (undated) DEVICE — TRAY URIN CATH PED 16FR BLLN 5CC INDWL STR TIP INF CTRL

## (undated) DEVICE — INSULATED BLADE ELECTRODE: Brand: EDGE

## (undated) DEVICE — NEEDLE HYPO 22GA L1 1/2IN PIVOTING SHLD FOR LUERLOCK SYR

## (undated) DEVICE — BAG DRAIN URIN 2000ML LF STRL -- CONVERT TO ITEM 363123

## (undated) DEVICE — SOL INJ SOD CL 0.9% 1000ML BG --

## (undated) DEVICE — ELECTRO LUBE IS A SINGLE PATIENT USE DEVICE THAT IS INTENDED TO BE USED ON ELECTROSURGICAL ELECTRODES TO REDUCE STICKING.: Brand: KEY SURGICAL ELECTRO LUBE

## (undated) DEVICE — CATHETER F BLLN 5CC 18FR 2 W HYDRGEL COAT LESS TRAUM LUB

## (undated) DEVICE — SPECIMEN RETRIEVAL POUCH: Brand: ENDO CATCH GOLD

## (undated) DEVICE — DECANTER VI C-FLO LF --

## (undated) DEVICE — SUTURE PDS II SZ 4-0 L27IN ABSRB VLT L17MM RB-1 1/2 CIR Z304H

## (undated) DEVICE — SUTURE VCRL SZ 0 L27IN ABSRB UD L26MM CT-2 1/2 CIR J270H

## (undated) DEVICE — NDL PRT INJ NSAF BLNT 18GX1.5 --

## (undated) DEVICE — BLADELESS OPTICAL TROCAR WITH FIXATION CANNULA: Brand: VERSAONE

## (undated) DEVICE — DRAPE KIT ACCS 4-ARM DISP -- DA VINCI

## (undated) DEVICE — (D)SYR 10ML 1/5ML GRAD NSAF -- PKGING CHANGE USE ITEM 338027

## (undated) DEVICE — CARTRIDGE CLP LIG HEMLOK GRN --

## (undated) DEVICE — TUBING IRRIG L77IN DIA0.241IN L BOR FOR CYSTO W/ NVENT

## (undated) DEVICE — BASIN EMESIS 500CC ROSE 250/CS 60/PLT: Brand: MEDEGEN MEDICAL PRODUCTS, LLC

## (undated) DEVICE — TRAY PREP DRY W/ PREM GLV 2 APPL 6 SPNG 2 UNDPD 1 OVERWRAP

## (undated) DEVICE — SLEEVE COMPR L THGH LEN WARP

## (undated) DEVICE — INTENDED FOR TISSUE SEPARATION, AND OTHER PROCEDURES THAT REQUIRE A SHARP SURGICAL BLADE TO PUNCTURE OR CUT.: Brand: BARD-PARKER ® CARBON RIB-BACK BLADES

## (undated) DEVICE — PACKING GZ W2INXL6FT WVN COT VAG RADPQ

## (undated) DEVICE — 3M™ IOBAN™ 2 ANTIMICROBIAL INCISE DRAPE 6650EZ: Brand: IOBAN™ 2

## (undated) DEVICE — SUTURE PROL SZ 4-0 L18IN NONABSORBABLE BLU L13MM PC-1 3/8 8619G

## (undated) DEVICE — FLOSEAL MATRIX IS INDICATED IN SURGICAL PROCEDURES (OTHER THAN IN OPHTHALMIC) AS AN ADJUNCT TO HEMOSTASIS WHEN CONTROL OF BLEEDING BY LIGATURE OR CONVENTIONALPROCEDURES IS INEFFECTIVE OR IMPRACTICAL.: Brand: FLOSEAL HEMOSTATIC MATRIX

## (undated) DEVICE — OBTRTR BLDELSS 8MM DISP -- DA VINCI - SNGL USE

## (undated) DEVICE — SYRINGE MED 3ML NDL 20GA L1.5IN PLAS N CTRL LUERLOCK TIP

## (undated) DEVICE — FORCEPS BX L240CM JAW DIA2.8MM L CAP W/ NDL MIC MESH TOOTH

## (undated) DEVICE — CLIP INT XL YEL POLYMER HEM-O-LOK WECK